# Patient Record
Sex: FEMALE | Race: WHITE | NOT HISPANIC OR LATINO | Employment: UNEMPLOYED | URBAN - METROPOLITAN AREA
[De-identification: names, ages, dates, MRNs, and addresses within clinical notes are randomized per-mention and may not be internally consistent; named-entity substitution may affect disease eponyms.]

---

## 2017-03-09 ENCOUNTER — TRANSFERRED RECORDS (OUTPATIENT)
Dept: HEALTH INFORMATION MANAGEMENT | Facility: CLINIC | Age: 52
End: 2017-03-09

## 2017-03-16 ENCOUNTER — TELEPHONE (OUTPATIENT)
Dept: INTERNAL MEDICINE | Facility: CLINIC | Age: 52
End: 2017-03-16

## 2017-03-16 NOTE — TELEPHONE ENCOUNTER
Patient called to schedule appointment for a hospital follow-up    Patient was admitted to :  [facility]  Discharged date: 3-11-17  Reason for hospital admission:  Lung issues, trouble breathing  Does patient have future appointment scheduled with provider? No  Date of future appointment:  There are no appts available with patients provider Dr. Childers, next week, as was told she needed to been seen within 5 days of discharge      This information will be used to help the care team plan for the patients upcoming visit.  The triage RN may determine that a follow up call is necessary and reach out to the patient via the phone number listed in the chart.     Please route this message high priority to the Triage RN pool.

## 2017-03-16 NOTE — TELEPHONE ENCOUNTER
"Hospital/TCU/ED for chronic condition Discharge Protocol    \"Hi, my name is Angelina Hernandezhelm, a registered nurse, and I am calling from [facility].  I am calling to follow up and see how things are going for you after your recent emergency visit/hospital/TCU stay.\"    Tell me how you are doing now that you are home?\" I am doing fine.\"       Discharge Instructions    \"Let's review your discharge instructions.  What is/are the follow-up recommendations?  Pt. Response:  I was to FU with Dr. Childers in 5 days. ( discharged 3/11/17)     \"Has an appointment with your primary care provider been scheduled?\"   No (schedule appointment) Scheduled to see DR. Childers on 3/21/17 at 4:15 PM     \"When you see the provider, I would recommend that you bring your medications with you.\"    Medications    \"Tell me what changed about your medicines when you discharged?\"    Changes to chronic meds?    0-1    \"What questions do you have about your medications?\"    None     New diagnoses of heart failure, COPD, diabetes, or MI?    No              Medication reconciliation completed?   Was MTM referral placed (*Make sure to put transitions as reason for referral)?   No    Call Summary    \"What questions or concerns do you have about your recent visit and your follow-up care?\"     none    \"If you have questions or things don't continue to improve, we encourage you contact us through the main clinic number (give number).  Even if the clinic is not open, triage nurses are available 24/7 to help you.     We would like you to know that our clinic has extended hours (provide information).  We also have urgent care (provide details on closest location and hours/contact info)\"      \"Thank you for your time and take care!\"             "

## 2017-04-05 ENCOUNTER — OFFICE VISIT (OUTPATIENT)
Dept: INTERNAL MEDICINE | Facility: CLINIC | Age: 52
End: 2017-04-05
Payer: COMMERCIAL

## 2017-04-05 VITALS
DIASTOLIC BLOOD PRESSURE: 84 MMHG | WEIGHT: 153 LBS | TEMPERATURE: 97.9 F | SYSTOLIC BLOOD PRESSURE: 126 MMHG | BODY MASS INDEX: 27.11 KG/M2 | HEART RATE: 94 BPM | OXYGEN SATURATION: 99 % | HEIGHT: 63 IN

## 2017-04-05 DIAGNOSIS — J45.20 INTERMITTENT ASTHMA, WELL CONTROLLED: ICD-10-CM

## 2017-04-05 DIAGNOSIS — R06.02 SOB (SHORTNESS OF BREATH): ICD-10-CM

## 2017-04-05 PROCEDURE — 99213 OFFICE O/P EST LOW 20 MIN: CPT | Performed by: INTERNAL MEDICINE

## 2017-04-05 RX ORDER — ALBUTEROL SULFATE 90 UG/1
AEROSOL, METERED RESPIRATORY (INHALATION)
Qty: 36 INHALER | Refills: 6 | Status: SHIPPED | OUTPATIENT
Start: 2017-04-05 | End: 2017-07-18

## 2017-04-05 RX ORDER — LORATADINE 10 MG/1
10 TABLET ORAL DAILY
COMMUNITY
End: 2019-05-03 | Stop reason: ALTCHOICE

## 2017-04-05 ASSESSMENT — PAIN SCALES - GENERAL: PAINLEVEL: NO PAIN (0)

## 2017-04-05 NOTE — PROGRESS NOTES
"Zina Harrell is a 51 year old female who presents with recheck after stay at [facility] in March 10-11. She ran out of medications and got sob.  She actually waited too long.  Then got prednisone and antibiotics and got better, still smoking.      Doing better, not coughing.  ACT score was 21.  Has Dulera and ventolin.      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 colp 8/2015 WNL     Current Outpatient Prescriptions   Medication     loratadine (CLARITIN) 10 MG tablet     DULERA 100-5 MCG/ACT oral inhaler     VENTOLIN  (90 BASE) MCG/ACT inhaler     No current facility-administered medications for this visit.      Physical Exam  /84 (BP Location: Right arm, Patient Position: Chair, Cuff Size: Adult Regular)  Pulse 94  Temp 97.9  F (36.6  C) (Temporal)  Ht 5' 3\" (1.6 m)  Wt 153 lb (69.4 kg)  SpO2 99%  BMI 27.1 kg/m2  General Appearance-healthy, alert, no distress  Cardiac-regular rate and rhythm  with normal S1, S2 ; no murmur, rub or gallops  Lungs-clear to auscultation    ASSESSMENT:  Patient here for a recheck after an asthma exacerbation and a stay in [facility]. This appears to be from running out of her controlled medication, just couldn't afford the $300 and was sob and then got better there. She is now on Dulera and I did refills. Continue Ventolin for as needed use.    Really needs to quit smoking.    Electronically signed by Lebron Childers MD      Electronically signed by Lebron Childers MD    "

## 2017-04-05 NOTE — MR AVS SNAPSHOT
"              After Visit Summary   4/5/2017    Zina Harrell    MRN: [id number]           Patient Information     Date Of Birth          1965        Visit Information        Provider Department      4/5/2017 10:30 AM Lebron Childers MD [facility]         Follow-ups after your visit        Your next 10 appointments already scheduled     Apr 05, 2017 10:30 AM CDT   [facility]    [address] [place] [id number]   [phone]           Bring a current list of meds and any records pertaining to this visit.  For Physicals, please bring immunization records and any forms needing to be filled out.  Please arrive 10 minutes early to complete paperwork.              Who to contact     If you have questions or need follow up information about today's clinic visit or your schedule please contact [facility] directly at [phone].  Normal or non-critical lab and imaging results will be communicated to you by HeartFlowhart, letter or phone within 4 business days after the clinic has received the results. If you do not hear from us within 7 days, please contact the clinic through [employer] or phone. If you have a critical or abnormal lab result, we will notify you by phone as soon as possible.  Submit refill requests through [employer] or call your pharmacy and they will forward the refill request to us. Please allow 3 business days for your refill to be completed.          Additional Information About Your Visit        [employer] Information     [employer] lets you send messages to your doctor, view your test results, renew your prescriptions, schedule appointments and more. To sign up, go to [email or web address] . Click on \"Log in\" on the left side of the screen, which will take you to the Welcome page. Then click on \"Sign up Now\" on the right side of the page.     You will be asked to enter the access " "code listed below, as well as some personal information. Please follow the directions to create your username and password.     Your access code is: [email or web address]  Expires: 2017 10:22 AM     Your access code will  in 90 days. If you need help or a new code, please call your [facility] or [phone].        Care EveryWhere ID     This is your Care EveryWhere ID. This could be used by other organizations to access your [place] medical records  [id number]        Your Vitals Were     Pulse Temperature Height Pulse Oximetry BMI (Body Mass Index)       94 97.9  F (36.6  C) (Temporal) 5' 3\" (1.6 m) 99% 27.1 kg/m2        Blood Pressure from Last 3 Encounters:   17 126/84   16 138/88   08/06/15 124/80    Weight from Last 3 Encounters:   17 153 lb (69.4 kg)   16 156 lb (70.8 kg)   08/06/15 148 lb (67.1 kg)              Today, you had the following     No orders found for display         Today's Medication Changes          These changes are accurate as of: 17 10:22 AM.  If you have any questions, ask your nurse or doctor.               Stop taking these medicines if you haven't already. Please contact your care team if you have questions.     cetirizine 10 MG tablet   Commonly known as:  zyrTEC                    Primary Care Provider Office Phone # Fax #    Lebron Childers [phone]       [facility] [address] [place] [address]        Thank you!     Thank you for choosing [facility]  for your care. Our goal is always to provide you with excellent care. Hearing back from our patients is one way we can continue to improve our services. Please take a few minutes to complete the written survey that you may receive in the mail after your visit with us. Thank you!             Your Updated Medication List - Protect others around you: Learn how to safely use, store and throw away your medicines at www.disposemymeds.org.        "   This list is accurate as of: 4/5/17 10:22 AM.  Always use your most recent med list.                   Brand Name Dispense Instructions for use    DULERA 100-5 MCG/ACT oral inhaler   Generic drug:  mometasone-formoterol     1 Inhaler    INHALE 2 PUFFS INTO THE LUNGS 2 TIMES DAILY       loratadine 10 MG tablet    CLARITIN     Take 10 mg by mouth daily       VENTOLIN  (90 BASE) MCG/ACT Inhaler   Generic drug:  albuterol     36 Inhaler    SHAKE WELL AND INHALE 2 PUFFS INTO THE LUNGS AS NEEDED

## 2017-04-06 ASSESSMENT — ASTHMA QUESTIONNAIRES: ACT_TOTALSCORE: 21

## 2017-07-18 ENCOUNTER — TELEPHONE (OUTPATIENT)
Dept: INTERNAL MEDICINE | Facility: CLINIC | Age: 52
End: 2017-07-18

## 2017-07-18 DIAGNOSIS — R06.02 SOB (SHORTNESS OF BREATH): ICD-10-CM

## 2017-07-18 DIAGNOSIS — J45.20 INTERMITTENT ASTHMA, WELL CONTROLLED: ICD-10-CM

## 2017-07-18 RX ORDER — ALBUTEROL SULFATE 90 UG/1
2 AEROSOL, METERED RESPIRATORY (INHALATION) EVERY 4 HOURS PRN
Qty: 3 INHALER | Refills: 6 | Status: SHIPPED | OUTPATIENT
Start: 2017-07-18 | End: 2018-07-26

## 2017-07-18 NOTE — TELEPHONE ENCOUNTER
Reason for Call:  Other prescription    Detailed comments: Allison calling from [facility] on this patient, patient is on VENTOLIN inhaler dosage 20 puffs per day and insurance will not over this, as it is above the recommended dose, pharmacy would like to know if you want to do a Prior Auth or change the dosage , please call and avdise    Phone Number Patient can be reached at: Other phone number:  [phone]    Best Time: any    Can we leave a detailed message on this number? Not Applicable    Call taken on 7/18/2017 at 10:07 AM by Ellie Pérez

## 2017-11-02 DIAGNOSIS — J45.20 INTERMITTENT ASTHMA, WELL CONTROLLED: ICD-10-CM

## 2017-11-06 RX ORDER — MOMETASONE FUROATE AND FORMOTEROL FUMARATE DIHYDRATE 100; 5 UG/1; UG/1
AEROSOL RESPIRATORY (INHALATION)
Qty: 13 G | Refills: 4 | Status: SHIPPED | OUTPATIENT
Start: 2017-11-06 | End: 2018-06-20

## 2017-11-06 NOTE — TELEPHONE ENCOUNTER
Prescription approved per [facility] Refill Protocol.  Imani Stanton RN

## 2017-11-19 ENCOUNTER — HEALTH MAINTENANCE LETTER (OUTPATIENT)
Age: 52
End: 2017-11-19

## 2017-11-27 ENCOUNTER — OFFICE VISIT (OUTPATIENT)
Dept: INTERNAL MEDICINE | Facility: CLINIC | Age: 52
End: 2017-11-27
Payer: COMMERCIAL

## 2017-11-27 VITALS
DIASTOLIC BLOOD PRESSURE: 84 MMHG | HEART RATE: 96 BPM | RESPIRATION RATE: 16 BRPM | BODY MASS INDEX: 27.99 KG/M2 | SYSTOLIC BLOOD PRESSURE: 122 MMHG | WEIGHT: 158 LBS | TEMPERATURE: 98.4 F | OXYGEN SATURATION: 100 %

## 2017-11-27 DIAGNOSIS — Z23 NEED FOR PROPHYLACTIC VACCINATION AND INOCULATION AGAINST INFLUENZA: ICD-10-CM

## 2017-11-27 DIAGNOSIS — Z12.11 SPECIAL SCREENING FOR MALIGNANT NEOPLASMS, COLON: ICD-10-CM

## 2017-11-27 DIAGNOSIS — Z72.0 TOBACCO ABUSE: ICD-10-CM

## 2017-11-27 DIAGNOSIS — Z00.00 ENCOUNTER FOR ROUTINE ADULT HEALTH EXAMINATION WITHOUT ABNORMAL FINDINGS: Primary | ICD-10-CM

## 2017-11-27 DIAGNOSIS — J30.2 CHRONIC SEASONAL ALLERGIC RHINITIS, UNSPECIFIED TRIGGER: ICD-10-CM

## 2017-11-27 DIAGNOSIS — J45.20 INTERMITTENT ASTHMA, WELL CONTROLLED: ICD-10-CM

## 2017-11-27 DIAGNOSIS — R05.9 COUGH: ICD-10-CM

## 2017-11-27 DIAGNOSIS — Z12.31 VISIT FOR SCREENING MAMMOGRAM: ICD-10-CM

## 2017-11-27 LAB
ALBUMIN SERPL-MCNC: 3.8 G/DL (ref 3.4–5)
ALP SERPL-CCNC: 94 U/L (ref 40–150)
ALT SERPL W P-5'-P-CCNC: 36 U/L (ref 0–50)
ANION GAP SERPL CALCULATED.3IONS-SCNC: 5 MMOL/L (ref 3–14)
AST SERPL W P-5'-P-CCNC: 23 U/L (ref 0–45)
BILIRUB SERPL-MCNC: 0.5 MG/DL (ref 0.2–1.3)
BUN SERPL-MCNC: 10 MG/DL (ref 7–30)
CALCIUM SERPL-MCNC: 8.8 MG/DL (ref 8.5–10.1)
CHLORIDE SERPL-SCNC: 107 MMOL/L (ref 94–109)
CHOLEST SERPL-MCNC: 120 MG/DL
CO2 SERPL-SCNC: 29 MMOL/L (ref 20–32)
CREAT SERPL-MCNC: 0.78 MG/DL (ref 0.52–1.04)
GFR SERPL CREATININE-BSD FRML MDRD: 78 ML/MIN/1.7M2
GLUCOSE SERPL-MCNC: 95 MG/DL (ref 70–99)
HDLC SERPL-MCNC: 105 MG/DL
LDLC SERPL CALC-MCNC: 11 MG/DL
NONHDLC SERPL-MCNC: 15 MG/DL
POTASSIUM SERPL-SCNC: 4 MMOL/L (ref 3.4–5.3)
PROT SERPL-MCNC: 7.4 G/DL (ref 6.8–8.8)
SODIUM SERPL-SCNC: 141 MMOL/L (ref 133–144)
TRIGL SERPL-MCNC: 22 MG/DL

## 2017-11-27 PROCEDURE — 36415 COLL VENOUS BLD VENIPUNCTURE: CPT | Performed by: INTERNAL MEDICINE

## 2017-11-27 PROCEDURE — 99396 PREV VISIT EST AGE 40-64: CPT | Mod: 25 | Performed by: INTERNAL MEDICINE

## 2017-11-27 PROCEDURE — 80053 COMPREHEN METABOLIC PANEL: CPT | Performed by: INTERNAL MEDICINE

## 2017-11-27 PROCEDURE — 90686 IIV4 VACC NO PRSV 0.5 ML IM: CPT | Performed by: INTERNAL MEDICINE

## 2017-11-27 PROCEDURE — 80061 LIPID PANEL: CPT | Performed by: INTERNAL MEDICINE

## 2017-11-27 PROCEDURE — 90471 IMMUNIZATION ADMIN: CPT | Performed by: INTERNAL MEDICINE

## 2017-11-27 PROCEDURE — G0145 SCR C/V CYTO,THINLAYER,RESCR: HCPCS | Performed by: INTERNAL MEDICINE

## 2017-11-27 PROCEDURE — 87624 HPV HI-RISK TYP POOLED RSLT: CPT | Performed by: INTERNAL MEDICINE

## 2017-11-27 RX ORDER — FLUTICASONE PROPIONATE 44 UG/1
AEROSOL, METERED RESPIRATORY (INHALATION)
Qty: 2 INHALER | Refills: 3 | Status: SHIPPED | OUTPATIENT
Start: 2017-11-27 | End: 2018-05-07 | Stop reason: DRUGHIGH

## 2017-11-27 RX ORDER — GUAIFENESIN 600 MG/1
600 TABLET, EXTENDED RELEASE ORAL 2 TIMES DAILY PRN
Qty: 60 TABLET | Refills: 3 | Status: SHIPPED | OUTPATIENT
Start: 2017-11-27 | End: 2018-05-07

## 2017-11-27 RX ORDER — CETIRIZINE HYDROCHLORIDE 10 MG/1
10 TABLET ORAL EVERY EVENING
Qty: 90 TABLET | Refills: 3 | Status: SHIPPED | OUTPATIENT
Start: 2017-11-27 | End: 2018-12-20

## 2017-11-27 ASSESSMENT — PAIN SCALES - GENERAL: PAINLEVEL: MODERATE PAIN (4)

## 2017-11-27 NOTE — LETTER
December 12, 2017    Zina Harrell  [address]   [place] [address]    Dear Zina,  We are happy to inform you that your PAP smear result from 11/27/17 is normal.  We are now able to do a follow up test on PAP smears. The DNA test is for HPV (Human Papilloma Virus). Cervical cancer is closely linked with certain types of HPV. Your result showed no evidence of high risk HPV.  Therefore we recommend you return in 3 years for your next pap smear and HPV test.  You will still need to return to the clinic every year for an annual exam and other preventive tests.  Please contact the clinic at [phone] with any questions.  Sincerely,    Lebron Childers MD/tianna

## 2017-11-27 NOTE — PROGRESS NOTES
Injectable Influenza Immunization Documentation    1.  Is the person to be vaccinated sick today?   No    2. Does the person to be vaccinated have an allergy to a component   of the vaccine?   No  Egg Allergy Algorithm Link    3. Has the person to be vaccinated ever had a serious reaction   to influenza vaccine in the past?   No    4. Has the person to be vaccinated ever had Guillain-Barré syndrome?   No    Form completed by Bambi Kasper CMA    Prior to injection verified patient identity using patient's name and date of birth. Pt was advised to stay in clinic for 15 minutes after flu shot and report any reactions right away.

## 2017-11-27 NOTE — MR AVS SNAPSHOT
"              After Visit Summary   2017    Zina Harrell    MRN: [id number]           Patient Information     Date Of Birth          1965        Visit Information        Provider Department      2017 11:30 AM Lebron Chiledrs MD [facility]        Today's Diagnoses     Asthma, intermittent controlled    -  1       Follow-ups after your visit        Who to contact     If you have questions or need follow up information about today's clinic visit or your schedule please contact [facility] directly at [phone].  Normal or non-critical lab and imaging results will be communicated to you by evolsohart, letter or phone within 4 business days after the clinic has received the results. If you do not hear from us within 7 days, please contact the clinic through evolsohart or phone. If you have a critical or abnormal lab result, we will notify you by phone as soon as possible.  Submit refill requests through [employer] or call your pharmacy and they will forward the refill request to us. Please allow 3 business days for your refill to be completed.          Additional Information About Your Visit        MyChart Information     [employer] lets you send messages to your doctor, view your test results, renew your prescriptions, schedule appointments and more. To sign up, go to [email or web address] . Click on \"Log in\" on the left side of the screen, which will take you to the Welcome page. Then click on \"Sign up Now\" on the right side of the page.     You will be asked to enter the access code listed below, as well as some personal information. Please follow the directions to create your username and password.     Your access code is: [email or web address]  Expires: 2018 11:54 AM     Your access code will  in 90 days. If you need help or a new code, please call your [facility] or [phone].        Care EveryWhere ID     This is your Care EveryWhere ID. This could be used by " other organizations to access your [place] medical records  [id number]        Your Vitals Were     Pulse Temperature Respirations Last Period Pulse Oximetry BMI (Body Mass Index)    96 98.4  F (36.9  C) (Temporal) 16 03/14/2016 (Within Months) 100% 27.99 kg/m2       Blood Pressure from Last 3 Encounters:   11/27/17 122/84   04/05/17 126/84   09/14/16 138/88    Weight from Last 3 Encounters:   11/27/17 158 lb (71.7 kg)   04/05/17 153 lb (69.4 kg)   09/14/16 156 lb (70.8 kg)              We Performed the Following     Asthma Action Plan (AAP)        Primary Care Provider Office Phone # Fax #    Lebron Childers -[phone]       [facility] [address] [place] [address]        Equal Access to Services     [facility] Hospital[facility] : Hadii aad gabriel hadasho Soomaali, waaxda luqadaha, qaybta kaalmada adeegyada, waxay remyin haylynda montero . So [facility] [phone].    ATENCIÓN: Si habla español, tiene a bay disposición servicios gratuitos de asistencia lingüística. Llame al [phone].    We comply with applicable federal civil rights laws and [place] laws. We do not discriminate on the basis of race, color, national origin, age, disability, sex, sexual orientation, or gender identity.            Thank you!     Thank you for choosing [facility]  for your care. Our goal is always to provide you with excellent care. Hearing back from our patients is one way we can continue to improve our services. Please take a few minutes to complete the written survey that you may receive in the mail after your visit with us. Thank you!             Your Updated Medication List - Protect others around you: Learn how to safely use, store and throw away your medicines at www.disposemymeds.org.          This list is accurate as of: 11/27/17 11:54 AM.  Always use your most recent med list.                   Brand Name Dispense Instructions for use Diagnosis    albuterol 108 (90 BASE)  MCG/ACT Inhaler    VENTOLIN HFA    3 Inhaler    Inhale 2 puffs into the lungs every 4 hours as needed for shortness of breath / dyspnea or wheezing SHAKE WELL AND INHALE 2 PUFFS INTO THE LUNGS AS NEEDED    Intermittent asthma, well controlled, SOB (shortness of breath)       DULERA 100-5 MCG/ACT oral inhaler   Generic drug:  mometasone-formoterol     13 g    INHALE 2 PUFFS INTO THE LUNGS 2 TIMES DAILY    Intermittent asthma, well controlled       loratadine 10 MG tablet    CLARITIN     Take 10 mg by mouth daily

## 2017-11-27 NOTE — PROGRESS NOTES
SUBJECTIVE:   CC: Zina Harrell is an 52 year old woman who presents for preventive health visit.     Physical   Annual:     Getting at least 3 servings of Calcium per day::  NO    Bi-annual eye exam::  Yes    Dental care twice a year::  NO    Sleep apnea or symptoms of sleep apnea::  None    Taking medications regularly::  Yes    Medication side effects::  None    Additional concerns today::  No    She has been ok on her asthma, she liked flovent better then dulera.  She wonders about zyrtec or mucinex to be covered.      She has a chronic tremor and her uncle had parkinson's disease.  Mother had tremor as well.       has moved out.  She is better with her mood.     Today's PHQ-2 Score: PHQ-2 ( 1999 Pfizer) 11/27/2017   Q1: Little interest or pleasure in doing things 0   Q2: Feeling down, depressed or hopeless 0   PHQ-2 Score 0   Q1: Little interest or pleasure in doing things Not at all   Q2: Feeling down, depressed or hopeless Not at all   PHQ-2 Score 0       Abuse: Current or Past(Physical, Sexual or Emotional)- No  Do you feel safe in your environment - Yes    Social History   Substance Use Topics     Smoking status: Current Every Day Smoker     Packs/day: 0.50     Smokeless tobacco: Never Used      Comment: smoked since age 20     Alcohol use Yes      Comment: rarely     The patient does not drink >3 drinks per day nor >7 drinks per week.    Reviewed orders with patient.  Reviewed health maintenance and updated orders accordingly - Yes    Patient over age 50, mutual decision to screen reflected in health maintenance.      Pertinent mammograms are reviewed under the imaging tab.  History of abnormal Pap smear: history of colposcopy and needs yearly pap.     Reviewed and updated as needed this visit by clinical staffAllergies         Reviewed and updated as needed this visit by Provider            Review of Systems  C: NEGATIVE for fever, chills, change in weight  I: NEGATIVE for worrisome rashes,  moles or lesions  E: NEGATIVE for vision changes or irritation  ENT: NEGATIVE for ear, mouth and throat problems  R: NEGATIVE for significant cough or SOB  B: NEGATIVE for masses, tenderness or discharge  CV: NEGATIVE for chest pain, palpitations or peripheral edema  GI: NEGATIVE for nausea, abdominal pain, heartburn, or change in bowel habits  : NEGATIVE for unusual urinary or vaginal symptoms. No vaginal bleeding.   menopausal female: history of colposcopy then ok pap.   M: NEGATIVE for significant arthralgias or myalgia  N: NEGATIVE for weakness, dizziness or paresthesias  P: NEGATIVE for changes in mood or affect      OBJECTIVE:   /84  Pulse 96  Temp 98.4  F (36.9  C) (Temporal)  Resp 16  Wt 158 lb (71.7 kg)  LMP 03/14/2016 (Within Months)  SpO2 100%  BMI 27.99 kg/m2  Physical Exam  GENERAL: healthy, alert and no distress  EYES: Eyes grossly normal to inspection, PERRL and conjunctivae and sclerae normal  HENT: ear canals and TM's normal, nose and mouth without ulcers or lesions  NECK: no adenopathy, no asymmetry, masses, or scars and thyroid normal to palpation  RESP: lungs clear to auscultation - no rales, rhonchi or wheezes  BREAST: normal without masses, tenderness or nipple discharge and no palpable axillary masses or adenopathy  CV: regular rate and rhythm, normal S1 S2, no S3 or S4, no murmur, click or rub, no peripheral edema and peripheral pulses strong  ABDOMEN: soft, nontender, no hepatosplenomegaly, no masses and bowel sounds normal   (female): normal female external genitalia, normal urethral meatus, vaginal mucosa pink, moist, well rugated, and normal cervix/adnexa/uterus without masses or discharge   (female): Crystal Kasper present for breast and pelvic exam  MS: no gross musculoskeletal defects noted, no edema  SKIN: no suspicious lesions or rashes  NEURO: Normal strength and tone, mentation intact and speech normal  PSYCH: mentation appears normal, affect  "normal/bright    ASSESSMENT/PLAN:       ICD-10-CM    1. Encounter for routine adult health examination without abnormal findings Z00.00 Lipid Profile     Comprehensive metabolic panel     Pap imaged thin layer screen with HPV - recommended age 30 - 65 years (select HPV order below)   2. Asthma, intermittent controlled J45.20 Asthma Action Plan (AAP)     fluticasone (FLOVENT HFA) 44 MCG/ACT Inhaler   3. Chronic seasonal allergic rhinitis, unspecified trigger J30.2 cetirizine (ZYRTEC) 10 MG tablet   4. Cough R05 guaiFENesin (MUCINEX) 600 MG 12 hr tablet   5. Special screening for malignant neoplasms, colon Z12.11 Fecal colorectal cancer screen (FIT)   6. Tobacco abuse Z72.0    7. Need for prophylactic vaccination and inoculation against influenza Z23 FLU VAC, SPLIT VIRUS IM > 3 YO (QUADRIVALENT) [98140]     Vaccine Administration, Initial [52377]   8. Visit for screening mammogram Z12.31 *MA Screening Digital Bilateral     Pap done due to past abnormal.  Asthma is ok, try other inhaler as she preferred those, needs to quit smoking,  Discussed fam history of tremors and dementia.    COUNSELING:  Reviewed preventive health counseling, as reflected in patient instructions       Regular exercise       Healthy diet/nutrition       Immunizations    Vaccinated for: Influenza               reports that she has been smoking.  She has been smoking about 0.50 packs per day. She has never used smokeless tobacco.  Tobacco Cessation Action Plan: Information offered: Patient not interested at this time  Estimated body mass index is 27.1 kg/(m^2) as calculated from the following:    Height as of 4/5/17: 5' 3\" (1.6 m).    Weight as of 4/5/17: 153 lb (69.4 kg).         Counseling Resources:  ATP IV Guidelines  Pooled Cohorts Equation Calculator  Breast Cancer Risk Calculator  FRAX Risk Assessment  ICSI Preventive Guidelines  Dietary Guidelines for Americans, 2010  USDA's MyPlate  ASA Prophylaxis  Lung CA Screening    Lebron Childers, " MD  [facility]

## 2017-11-27 NOTE — NURSING NOTE
"Chief Complaint   Patient presents with     Physical       Initial /84  Pulse 96  Temp 98.4  F (36.9  C) (Temporal)  Resp 16  Wt 158 lb (71.7 kg)  LMP 03/14/2016 (Within Months)  SpO2 100%  BMI 27.99 kg/m2 Estimated body mass index is 27.99 kg/(m^2) as calculated from the following:    Height as of 4/5/17: 5' 3\" (1.6 m).    Weight as of this encounter: 158 lb (71.7 kg).  Medication Reconciliation: complete    "

## 2017-11-28 ASSESSMENT — ASTHMA QUESTIONNAIRES: ACT_TOTALSCORE: 23

## 2017-11-30 LAB
COPATH REPORT: NORMAL
PAP: NORMAL

## 2017-12-01 LAB
FINAL DIAGNOSIS: NORMAL
HPV HR 12 DNA CVX QL NAA+PROBE: NEGATIVE
HPV16 DNA SPEC QL NAA+PROBE: NEGATIVE
HPV18 DNA SPEC QL NAA+PROBE: NEGATIVE
SPECIMEN DESCRIPTION: NORMAL

## 2018-01-11 ENCOUNTER — RADIANT APPOINTMENT (OUTPATIENT)
Dept: MAMMOGRAPHY | Facility: OTHER | Age: 53
End: 2018-01-11
Attending: INTERNAL MEDICINE
Payer: COMMERCIAL

## 2018-01-11 DIAGNOSIS — Z12.31 VISIT FOR SCREENING MAMMOGRAM: ICD-10-CM

## 2018-01-11 PROCEDURE — 77067 SCR MAMMO BI INCL CAD: CPT | Mod: TC

## 2018-02-28 ENCOUNTER — TELEPHONE (OUTPATIENT)
Dept: FAMILY MEDICINE | Facility: CLINIC | Age: 53
End: 2018-02-28

## 2018-02-28 DIAGNOSIS — J45.20 INTERMITTENT ASTHMA, WELL CONTROLLED: ICD-10-CM

## 2018-02-28 RX ORDER — FLUTICASONE PROPIONATE 110 UG/1
2 AEROSOL, METERED RESPIRATORY (INHALATION) 2 TIMES DAILY
Qty: 3 INHALER | Refills: 3 | Status: SHIPPED | OUTPATIENT
Start: 2018-02-28 | End: 2019-04-04

## 2018-04-24 ENCOUNTER — TELEPHONE (OUTPATIENT)
Dept: FAMILY MEDICINE | Facility: CLINIC | Age: 53
End: 2018-04-24

## 2018-04-24 NOTE — TELEPHONE ENCOUNTER
Panel Management Review      Patient has the following on her problem list:     Asthma review     ACT Total Scores 11/27/2017   ACT TOTAL SCORE -   ASTHMA ER VISITS -   ASTHMA HOSPITALIZATIONS -   ACT TOTAL SCORE (Goal Greater than or Equal to 20) 23   In the past 12 months, how many times did you visit the emergency room for your asthma without being admitted to the hospital? 1   In the past 12 months, how many times were you hospitalized overnight because of your asthma? 1      1. Is Asthma diagnosis on the Problem List? Yes    2. Is Asthma listed on Health Maintenance? Yes    3. Patient is due for:  ACT      Composite cancer screening  Chart review shows that this patient is due/due soon for the following None  Summary:    Patient is due/failing the following:   ACT    Action needed:   ACT form mailed to pt to fill out and return     Type of outreach:    Copy of ACT mailed to patient, will reach out in 5 days.    Questions for provider review:    None                                                                                                                                    Bambi Kasper [facility]       Chart routed to Care Team .

## 2018-04-24 NOTE — LETTER
April 24, 2018    Zina Harrell  [address]  [place] [address]    Dear Ronn Izaguirre cares about your health and your health plan.  I have reviewed your medical conditions, medication list and lab results, and am making recommendations based on this review to better manage your health.    You are in particular need of attention regarding:  -Asthma    I am recommending that you:     Please fill out the form and mail back.     Please call us at the [phone] or use [employer] to address the above recommendations.     Thank you for trusting [facility].  We appreciate the opportunity to serve you and look forward to supporting your healthcare in the future.    If you have (or plan to have) any of these tests done at a facility other than a [facility] or a [facility], please have the results sent to the [facility] location noted above.      Best Regards,    Dr. Childers

## 2018-05-07 ENCOUNTER — OFFICE VISIT (OUTPATIENT)
Dept: INTERNAL MEDICINE | Facility: CLINIC | Age: 53
End: 2018-05-07
Payer: COMMERCIAL

## 2018-05-07 VITALS
WEIGHT: 167 LBS | RESPIRATION RATE: 16 BRPM | HEART RATE: 94 BPM | BODY MASS INDEX: 29.59 KG/M2 | SYSTOLIC BLOOD PRESSURE: 126 MMHG | DIASTOLIC BLOOD PRESSURE: 84 MMHG | TEMPERATURE: 98.5 F | HEIGHT: 63 IN | OXYGEN SATURATION: 95 %

## 2018-05-07 DIAGNOSIS — Z78.0 MENOPAUSE PRESENT: Primary | ICD-10-CM

## 2018-05-07 DIAGNOSIS — Z12.11 SPECIAL SCREENING FOR MALIGNANT NEOPLASM OF COLON: ICD-10-CM

## 2018-05-07 DIAGNOSIS — R05.9 COUGH: ICD-10-CM

## 2018-05-07 DIAGNOSIS — J45.20 INTERMITTENT ASTHMA, WELL CONTROLLED: ICD-10-CM

## 2018-05-07 DIAGNOSIS — Z72.0 TOBACCO ABUSE: ICD-10-CM

## 2018-05-07 LAB
FSH SERPL-ACNC: 105.7 IU/L
LH SERPL-ACNC: 34.4 IU/L

## 2018-05-07 PROCEDURE — 83001 ASSAY OF GONADOTROPIN (FSH): CPT | Performed by: INTERNAL MEDICINE

## 2018-05-07 PROCEDURE — 36415 COLL VENOUS BLD VENIPUNCTURE: CPT | Performed by: INTERNAL MEDICINE

## 2018-05-07 PROCEDURE — 83002 ASSAY OF GONADOTROPIN (LH): CPT | Performed by: INTERNAL MEDICINE

## 2018-05-07 PROCEDURE — 99213 OFFICE O/P EST LOW 20 MIN: CPT | Performed by: INTERNAL MEDICINE

## 2018-05-07 RX ORDER — GUAIFENESIN 600 MG/1
600 TABLET, EXTENDED RELEASE ORAL 2 TIMES DAILY PRN
Qty: 60 TABLET | Refills: 3 | Status: SHIPPED | OUTPATIENT
Start: 2018-05-07 | End: 2019-05-03

## 2018-05-07 ASSESSMENT — PAIN SCALES - GENERAL: PAINLEVEL: NO PAIN (0)

## 2018-05-07 NOTE — PROGRESS NOTES
"  SUBJECTIVE:   Zina Harrell is a 52 year old female who presents to clinic today for the following health issues:      Chief Complaint   Patient presents with     Menopausal Sx     discuss menopause symptoms     She hasn't had a period for a few years.  She was getting hot flashes with face redness and hotness that is less now.  No hotflashes for a few months.     More emotional like with her periods she says.  Hard to know about it with her stress as well.   Commercials can get her emotional.      She is interested in checking labs for her hormones.      BP is stable now.    She is living on her own now,     Past Medical History:   Diagnosis Date     Asthma, intermittent controlled age 8    age 16 hospitalized     High risk HPV infection 6/5/15    normal pap. colp 8/2015 WNL     Current Outpatient Prescriptions   Medication     albuterol (VENTOLIN HFA) 108 (90 BASE) MCG/ACT Inhaler     cetirizine (ZYRTEC) 10 MG tablet     DULERA 100-5 MCG/ACT oral inhaler     fluticasone (FLOVENT HFA) 110 MCG/ACT Inhaler     guaiFENesin (MUCINEX) 600 MG 12 hr tablet     loratadine (CLARITIN) 10 MG tablet     [DISCONTINUED] fluticasone (FLOVENT HFA) 44 MCG/ACT Inhaler     No current facility-administered medications for this visit.      Social History   Substance Use Topics     Smoking status: Current Every Day Smoker     Packs/day: 0.50     Smokeless tobacco: Never Used      Comment: smoked since age 20     Alcohol use Yes      Comment: rarely     Physical Exam  /84  Pulse 94  Temp 98.5  F (36.9  C) (Temporal)  Resp 16  Ht 5' 3\" (1.6 m)  Wt 167 lb (75.8 kg)  LMP 03/14/2016 (Within Months)  SpO2 95%  BMI 29.58 kg/m2  General Appearance-healthy, alert, no distress  Mild emotional    ASSESSMENT:  Patient that has gone through menapause and is curious about it. She denies hot flashes recently, no periods for two years. She does have some stress and she gets emotional about not having children and now knowing she " can't have them. She doesn't want to do more counseling, she doesn't want any medication. We will check labs for her today.      Asthma is ok, was worse in the winter but now doing better, knows she still has to quit smoking,    Electronically signed by Lebron Childers MD

## 2018-05-07 NOTE — MR AVS SNAPSHOT
"              After Visit Summary   2018    Zina Harrell    MRN: [id number]           Patient Information     Date Of Birth          1965        Visit Information        Provider Department      2018 1:00 PM Lebron Childers MD [facility]         Follow-ups after your visit        Who to contact     If you have questions or need follow up information about today's clinic visit or your schedule please contact [facility] directly at [phone].  Normal or non-critical lab and imaging results will be communicated to you by MyChart, letter or phone within 4 business days after the clinic has received the results. If you do not hear from us within 7 days, please contact the clinic through MyChart or phone. If you have a critical or abnormal lab result, we will notify you by phone as soon as possible.  Submit refill requests through [employer] or call your pharmacy and they will forward the refill request to us. Please allow 3 business days for your refill to be completed.          Additional Information About Your Visit        [facility] Information     [employer] lets you send messages to your doctor, view your test results, renew your prescriptions, schedule appointments and more. To sign up, go to [email or web address] . Click on \"Log in\" on the left side of the screen, which will take you to the Welcome page. Then click on \"Sign up Now\" on the right side of the page.     You will be asked to enter the access code listed below, as well as some personal information. Please follow the directions to create your username and password.     Your access code is: [email or web address]  Expires: 2018  1:02 PM     Your access code will  in 90 days. If you need help or a new code, please call your [facility] or [phone].        Care EveryWhere ID     This is your Care EveryWhere ID. This could be used by other organizations to access your [facility] medical " "records  [id number]        Your Vitals Were     Pulse Temperature Respirations Height Last Period Pulse Oximetry    94 98.5  F (36.9  C) (Temporal) 16 5' 3\" (1.6 m) 03/14/2016 (Within Months) 95%    BMI (Body Mass Index)                   29.58 kg/m2            Blood Pressure from Last 3 Encounters:   05/07/18 126/84   11/27/17 122/84   04/05/17 126/84    Weight from Last 3 Encounters:   05/07/18 167 lb (75.8 kg)   11/27/17 158 lb (71.7 kg)   04/05/17 153 lb (69.4 kg)              Today, you had the following     No orders found for display         Today's Medication Changes          These changes are accurate as of 5/7/18  1:02 PM.  If you have any questions, ask your nurse or doctor.               These medicines have changed or have updated prescriptions.        Dose/Directions    fluticasone 110 MCG/ACT Inhaler   Commonly known as:  FLOVENT HFA   This may have changed:  Another medication with the same name was removed. Continue taking this medication, and follow the directions you see here.   Used for:  Intermittent asthma, well controlled   Changed by:  Lebron Childers MD        Dose:  2 puff   Inhale 2 puffs into the lungs 2 times daily   Quantity:  3 Inhaler   Refills:  3                Primary Care Provider Office Phone # Fax #    Lebron Childers [phone]       [address] [place] [address]        Equal Access to Services     [facility]wardRULA AH: Hadii aad ku hadasho Soomaali, waaxda luqadaha, qaybta kaalmada adeegyada, waxcecy christopher haylynda weston la'lynda ah. So [facility] [phone].    ATENCIÓN: Si habla español, tiene a bay disposición servicios gratuitos de asistencia lingüística. Llame al [phone].    We comply with applicable federal civil rights laws and [place] laws. We do not discriminate on the basis of race, color, national origin, age, disability, sex, sexual orientation, or gender identity.            Thank you!     Thank you for choosing [facility]" for your care. Our goal is always to provide you with excellent care. Hearing back from our patients is one way we can continue to improve our services. Please take a few minutes to complete the written survey that you may receive in the mail after your visit with us. Thank you!             Your Updated Medication List - Protect others around you: Learn how to safely use, store and throw away your medicines at www.disposemymeds.org.          This list is accurate as of 5/7/18  1:02 PM.  Always use your most recent med list.                   Brand Name Dispense Instructions for use Diagnosis    albuterol 108 (90 Base) MCG/ACT Inhaler    VENTOLIN HFA    3 Inhaler    Inhale 2 puffs into the lungs every 4 hours as needed for shortness of breath / dyspnea or wheezing SHAKE WELL AND INHALE 2 PUFFS INTO THE LUNGS AS NEEDED    Intermittent asthma, well controlled, SOB (shortness of breath)       cetirizine 10 MG tablet    zyrTEC    90 tablet    Take 1 tablet (10 mg) by mouth every evening    Chronic seasonal allergic rhinitis, unspecified trigger       DULERA 100-5 MCG/ACT oral inhaler   Generic drug:  mometasone-formoterol     13 g    INHALE 2 PUFFS INTO THE LUNGS 2 TIMES DAILY    Intermittent asthma, well controlled       fluticasone 110 MCG/ACT Inhaler    FLOVENT HFA    3 Inhaler    Inhale 2 puffs into the lungs 2 times daily    Intermittent asthma, well controlled       guaiFENesin 600 MG 12 hr tablet    MUCINEX    60 tablet    Take 1 tablet (600 mg) by mouth 2 times daily as needed for congestion    Cough       loratadine 10 MG tablet    CLARITIN     Take 10 mg by mouth daily

## 2018-05-08 ASSESSMENT — ASTHMA QUESTIONNAIRES: ACT_TOTALSCORE: 20

## 2018-05-09 ENCOUNTER — TELEPHONE (OUTPATIENT)
Dept: INTERNAL MEDICINE | Facility: CLINIC | Age: 53
End: 2018-05-09

## 2018-06-20 RX ORDER — MULTIPLE VITAMINS W/ MINERALS TAB 9MG-400MCG
1 TAB ORAL DAILY
COMMUNITY

## 2018-06-20 RX ORDER — OMEGA-3-ACID ETHYL ESTERS 1 G/1
2 CAPSULE, LIQUID FILLED ORAL 2 TIMES DAILY
COMMUNITY
End: 2022-02-01

## 2018-07-02 ENCOUNTER — HOSPITAL ENCOUNTER (OUTPATIENT)
Facility: CLINIC | Age: 53
Discharge: HOME | End: 2018-07-02
Attending: FAMILY MEDICINE | Admitting: FAMILY MEDICINE
Payer: COMMERCIAL

## 2018-07-02 ENCOUNTER — ANESTHESIA (OUTPATIENT)
Dept: GASTROENTEROLOGY | Facility: CLINIC | Age: 53
End: 2018-07-02
Payer: COMMERCIAL

## 2018-07-02 ENCOUNTER — ANESTHESIA EVENT (OUTPATIENT)
Dept: GASTROENTEROLOGY | Facility: CLINIC | Age: 53
End: 2018-07-02
Payer: COMMERCIAL

## 2018-07-02 ENCOUNTER — SURGERY (OUTPATIENT)
Age: 53
End: 2018-07-02
Payer: COMMERCIAL

## 2018-07-02 VITALS
HEART RATE: 84 BPM | TEMPERATURE: 98.1 F | RESPIRATION RATE: 16 BRPM | DIASTOLIC BLOOD PRESSURE: 84 MMHG | OXYGEN SATURATION: 100 % | SYSTOLIC BLOOD PRESSURE: 124 MMHG

## 2018-07-02 LAB — COLONOSCOPY: NORMAL

## 2018-07-02 PROCEDURE — 45378 DIAGNOSTIC COLONOSCOPY: CPT | Performed by: FAMILY MEDICINE

## 2018-07-02 PROCEDURE — 40000296 ZZH STATISTIC ENDO RECOVERY CLASS 1:2 FIRST HOUR: Performed by: FAMILY MEDICINE

## 2018-07-02 PROCEDURE — 25000128 H RX IP 250 OP 636: Performed by: NURSE ANESTHETIST, CERTIFIED REGISTERED

## 2018-07-02 PROCEDURE — 25000125 ZZHC RX 250: Performed by: SURGERY

## 2018-07-02 PROCEDURE — G0121 COLON CA SCRN NOT HI RSK IND: HCPCS | Performed by: FAMILY MEDICINE

## 2018-07-02 PROCEDURE — 25000125 ZZHC RX 250: Performed by: NURSE ANESTHETIST, CERTIFIED REGISTERED

## 2018-07-02 RX ORDER — PROPOFOL 10 MG/ML
INJECTION, EMULSION INTRAVENOUS PRN
Status: DISCONTINUED | OUTPATIENT
Start: 2018-07-02 | End: 2018-07-02

## 2018-07-02 RX ORDER — ONDANSETRON 2 MG/ML
4 INJECTION INTRAMUSCULAR; INTRAVENOUS EVERY 30 MIN PRN
Status: DISCONTINUED | OUTPATIENT
Start: 2018-07-02 | End: 2018-07-02 | Stop reason: HOSPADM

## 2018-07-02 RX ORDER — NALOXONE HYDROCHLORIDE 0.4 MG/ML
.1-.4 INJECTION, SOLUTION INTRAMUSCULAR; INTRAVENOUS; SUBCUTANEOUS
Status: DISCONTINUED | OUTPATIENT
Start: 2018-07-02 | End: 2018-07-02 | Stop reason: HOSPADM

## 2018-07-02 RX ORDER — SODIUM CHLORIDE, SODIUM LACTATE, POTASSIUM CHLORIDE, CALCIUM CHLORIDE 600; 310; 30; 20 MG/100ML; MG/100ML; MG/100ML; MG/100ML
INJECTION, SOLUTION INTRAVENOUS CONTINUOUS
Status: DISCONTINUED | OUTPATIENT
Start: 2018-07-02 | End: 2018-07-02 | Stop reason: HOSPADM

## 2018-07-02 RX ORDER — ONDANSETRON 2 MG/ML
4 INJECTION INTRAMUSCULAR; INTRAVENOUS
Status: DISCONTINUED | OUTPATIENT
Start: 2018-07-02 | End: 2018-07-02 | Stop reason: HOSPADM

## 2018-07-02 RX ORDER — MEPERIDINE HYDROCHLORIDE 25 MG/ML
12.5 INJECTION INTRAMUSCULAR; INTRAVENOUS; SUBCUTANEOUS
Status: DISCONTINUED | OUTPATIENT
Start: 2018-07-02 | End: 2018-07-02 | Stop reason: HOSPADM

## 2018-07-02 RX ORDER — LIDOCAINE 40 MG/G
CREAM TOPICAL
Status: DISCONTINUED | OUTPATIENT
Start: 2018-07-02 | End: 2018-07-02 | Stop reason: HOSPADM

## 2018-07-02 RX ORDER — ONDANSETRON 4 MG/1
4 TABLET, ORALLY DISINTEGRATING ORAL EVERY 30 MIN PRN
Status: DISCONTINUED | OUTPATIENT
Start: 2018-07-02 | End: 2018-07-02 | Stop reason: HOSPADM

## 2018-07-02 RX ORDER — LIDOCAINE 40 MG/G
CREAM TOPICAL
Status: DISCONTINUED | OUTPATIENT
Start: 2018-07-02 | End: 2018-07-02

## 2018-07-02 RX ORDER — PROPOFOL 10 MG/ML
INJECTION, EMULSION INTRAVENOUS CONTINUOUS PRN
Status: DISCONTINUED | OUTPATIENT
Start: 2018-07-02 | End: 2018-07-02

## 2018-07-02 RX ADMIN — LIDOCAINE HYDROCHLORIDE 1 ML: 10 INJECTION, SOLUTION EPIDURAL; INFILTRATION; INTRACAUDAL; PERINEURAL at 09:21

## 2018-07-02 RX ADMIN — PROPOFOL 125 MCG/KG/MIN: 10 INJECTION, EMULSION INTRAVENOUS at 11:04

## 2018-07-02 RX ADMIN — SODIUM CHLORIDE, POTASSIUM CHLORIDE, SODIUM LACTATE AND CALCIUM CHLORIDE: 600; 310; 30; 20 INJECTION, SOLUTION INTRAVENOUS at 09:21

## 2018-07-02 RX ADMIN — PROPOFOL 40 MG: 10 INJECTION, EMULSION INTRAVENOUS at 11:06

## 2018-07-02 NOTE — IP AVS SNAPSHOT
MRN:[id number]                      After Visit Summary   7/2/2018    Zina Harrell    MRN: [id number]           Thank you!     Thank you for choosing [facility] for your care. Our goal is always to provide you with excellent care. Hearing back from our patients is one way we can continue to improve our services. Please take a few minutes to complete the written survey that you may receive in the mail after you visit with us. Thank you!        Patient Information     Date Of Birth          1965        About your hospital stay     You were admitted on:  July 2, 2018 You last received care in the:  [facility]    You were discharged on:  July 2, 2018       Who to Call     For medical emergencies, please call 911.  For non-urgent questions about your medical care, please call your primary care provider or clinic, [phone]  For questions related to your surgery, please call your surgery clinic        Attending Provider     Provider Sundeep Gleason MD Family Practice       Primary Care Provider Office Phone # Fax #    Lebron Childers [phone]      Further instructions from your care team         [facility]    Home Care Following Endoscopy          Activity:    You have just undergone an endoscopic procedure usually performed with conscious sedation.  Do not work or operate machinery (including a car) for at least 12 hours.      I encourage you to walk and attempt to pass this air as soon as possible.    Diet:    Return to the diet you were on before your procedure but eat lightly for the first 12-24 hours.    Drink plenty of water.    Resume any regular medications unless otherwise advised by your physician.  Please begin any new medication prescribed as a result of your procedure as directed by your physician.     If you had any biopsy or polyp removed please refrain from aspirin or aspirin products for 2 days.   "If on Coumadin please restart as instructed by your physician.   Pain:    You may take Tylenol as needed for pain.  Expected Recovery:    You can expect some mild abdominal fullness and/or discomfort due to the air used to inflate your intestinal tract.     Call Your Physician if You Have:      After Colonoscopy:  o Worsening persisting abdominal pain which is worse with activity.  o Fevers (>101 degrees F), chills or shakes.  o Passage of continued blood with bowel movements.     Any questions or concerns about your recovery, please call [phone] or after hours [phone] Nurse Advice Line.    Follow-up Care:    You should receive a call or letter with your results within 1 week. Please call if you have not received a notification of your results.    If asked to return to clinic please make an appointment 1 week after your procedure.  Call [phone].    Pending Results     No orders found from 2018 to 7/3/2018.            Admission Information     Date & Time Provider Department Dept. Phone    2018 Sundeep [facility] MD [facility] [phone]      Your Vitals Were     Blood Pressure Pulse Temperature Respirations Last Period Pulse Oximetry    119/80 84 98.1  F (36.7  C) (Oral) 16 2016 (Within Months) 99%      MyChart Information     [employer] lets you send messages to your doctor, view your test results, renew your prescriptions, schedule appointments and more. To sign up, go to [email or web address] . Click on \"Log in\" on the left side of the screen, which will take you to the Welcome page. Then click on \"Sign up Now\" on the right side of the page.     You will be asked to enter the access code listed below, as well as some personal information. Please follow the directions to create your username and password.     Your access code is: [email or web address]  Expires: 2018  1:02 PM     Your access code will  in 90 days. If you need help or a new code, please call your " [facility] or [phone].        Care EveryWhere ID     This is your Care EveryWhere ID. This could be used by other organizations to access your [facility] medical records  [id number]        Equal Access to Services     RAFAL STAPLETON : Hadii aad ku hadgelyluther Sobrandiali, waaxda luqadaha, qaybta kaalmada adenidiada, feroz burroughsaugustina pateljanes weston winston chaves. So [facility] [phone].    ATENCIÓN: Si habla español, tiene a bay disposición servicios gratuitos de asistencia lingüística. Llame al [phone].    We comply with applicable federal civil rights laws and [place] laws. We do not discriminate on the basis of race, color, national origin, age, disability, sex, sexual orientation, or gender identity.               Review of your medicines      UNREVIEWED medicines. Ask your doctor about these medicines        Dose / Directions    albuterol 108 (90 Base) MCG/ACT Inhaler   Commonly known as:  VENTOLIN HFA   Used for:  Intermittent asthma, well controlled, SOB (shortness of breath)        Dose:  2 puff   Inhale 2 puffs into the lungs every 4 hours as needed for shortness of breath / dyspnea or wheezing SHAKE WELL AND INHALE 2 PUFFS INTO THE LUNGS AS NEEDED   Quantity:  3 Inhaler   Refills:  6       cetirizine 10 MG tablet   Commonly known as:  zyrTEC   Used for:  Chronic seasonal allergic rhinitis, unspecified trigger        Dose:  10 mg   Take 1 tablet (10 mg) by mouth every evening   Quantity:  90 tablet   Refills:  3       fluticasone 110 MCG/ACT Inhaler   Commonly known as:  FLOVENT HFA   Used for:  Intermittent asthma, well controlled        Dose:  2 puff   Inhale 2 puffs into the lungs 2 times daily   Quantity:  3 Inhaler   Refills:  3       guaiFENesin 600 MG 12 hr tablet   Commonly known as:  MUCINEX   Used for:  Cough        Dose:  600 mg   Take 1 tablet (600 mg) by mouth 2 times daily as needed for congestion   Quantity:  60 tablet   Refills:  3       loratadine 10 MG tablet   Commonly known as:  CLARITIN         Dose:  10 mg   Take 10 mg by mouth daily   Refills:  0       Multi-vitamin Tabs tablet        Dose:  1 tablet   Take 1 tablet by mouth daily   Refills:  0       omega-3 acid ethyl esters 1 g capsule   Commonly known as:  Lovaza        Dose:  2 g   Take 2 g by mouth 2 times daily   Refills:  0                Protect others around you: Learn how to safely use, store and throw away your medicines at www.disposemymeds.org.             Medication List: This is a list of all your medications and when to take them. Check marks below indicate your daily home schedule. Keep this list as a reference.      Medications           Morning Afternoon Evening Bedtime As Needed    albuterol 108 (90 Base) MCG/ACT Inhaler   Commonly known as:  VENTOLIN HFA   Inhale 2 puffs into the lungs every 4 hours as needed for shortness of breath / dyspnea or wheezing SHAKE WELL AND INHALE 2 PUFFS INTO THE LUNGS AS NEEDED                                cetirizine 10 MG tablet   Commonly known as:  zyrTEC   Take 1 tablet (10 mg) by mouth every evening                                fluticasone 110 MCG/ACT Inhaler   Commonly known as:  FLOVENT HFA   Inhale 2 puffs into the lungs 2 times daily                                guaiFENesin 600 MG 12 hr tablet   Commonly known as:  MUCINEX   Take 1 tablet (600 mg) by mouth 2 times daily as needed for congestion                                loratadine 10 MG tablet   Commonly known as:  CLARITIN   Take 10 mg by mouth daily                                Multi-vitamin Tabs tablet   Take 1 tablet by mouth daily                                omega-3 acid ethyl esters 1 g capsule   Commonly known as:  Lovaza   Take 2 g by mouth 2 times daily

## 2018-07-02 NOTE — ANESTHESIA POSTPROCEDURE EVALUATION
Patient: Zina Harrell    Procedure(s):  COLONOSCOPY - Wound Class: II-Clean Contaminated    Diagnosis:Special screening for malignant neoplasm of colon  Diagnosis Additional Information: No value filed.    Anesthesia Type:  MAC    Note:  Anesthesia Post Evaluation    Patient location during evaluation: Phase 2  Patient participation: Able to fully participate in evaluation  Level of consciousness: awake and alert  Pain management: adequate  Airway patency: patent  Cardiovascular status: acceptable  Respiratory status: acceptable  Hydration status: acceptable  PONV: none             Last vitals:  Vitals:    07/02/18 1130 07/02/18 1145 07/02/18 1200   BP: 116/76 (!) 126/91 124/84   Pulse:      Resp:      Temp:      SpO2: 100% 100%          Electronically Signed By: ANIYAH Finney CRNA  July 2, 2018  1:07 PM

## 2018-07-02 NOTE — ANESTHESIA CARE TRANSFER NOTE
Patient: Zina Harrell    Procedure(s):  COLONOSCOPY - Wound Class: II-Clean Contaminated    Diagnosis: Special screening for malignant neoplasm of colon  Diagnosis Additional Information: No value filed.    Anesthesia Type:   MAC     Note:  Anesthesia Care Transfer Notewriter    Vitals: (Last set prior to Anesthesia Care Transfer)    CRNA VITALS  7/2/2018 1055 - 7/2/2018 1145      7/2/2018             Pulse: 78    SpO2: 100 %    Resp Rate (observed): 12                Electronically Signed By: ANIYAH Finney CRNA  July 2, 2018  11:45 AM

## 2018-07-02 NOTE — ANESTHESIA PREPROCEDURE EVALUATION
Anesthesia Evaluation     . Pt has had prior anesthetic.            ROS/MED HX    ENT/Pulmonary:     (+)Intermittent asthma , . .    Neurologic:  - neg neurologic ROS     Cardiovascular:  - neg cardiovascular ROS   (+) ----. : . . . :. . No previous cardiac testing       METS/Exercise Tolerance:     Hematologic:  - neg hematologic  ROS       Musculoskeletal:  - neg musculoskeletal ROS       GI/Hepatic:  - neg GI/hepatic ROS       Renal/Genitourinary:  - ROS Renal section negative       Endo:  - neg endo ROS       Psychiatric:     (+) psychiatric history anxiety      Infectious Disease:  - neg infectious disease ROS       Malignancy:      - no malignancy   Other: Comment: Post menopasual   - neg other ROS                 Physical Exam  Normal systems: cardiovascular and pulmonary    Airway   Mallampati: II  TM distance: >3 FB  Neck ROM: full    Dental   Comment: Poor denition    Cardiovascular       Pulmonary                     Anesthesia Plan      History & Physical Review  History and physical reviewed and following examination; no interval change.    ASA Status:  2 .    NPO Status:  > 8 hours    Plan for MAC with Intravenous and Propofol induction. Maintenance will be TIVA.  Reason for MAC:  Deep or markedly invasive procedure (G8)  PONV prophylaxis:  Ondansetron (or other 5HT-3)       Postoperative Care      Consents  Anesthetic plan, risks, benefits and alternatives discussed with:  Patient.  Use of blood products discussed: No .   .                          .

## 2018-07-02 NOTE — H&P
Pre-Endoscopy History and Physical     Zina Harrell MRN# [id number]   YOB: 1965 Age: 52 year old     Date of Procedure: 7/2/2018  Primary care provider: Lebron Childers  Type of Endoscopy: colonoscopy  Type of Anesthesia Anticipated: MAC    HPI:    Zina is a 52 year old female who will be undergoing the above procedure.      Zina is feeling well today. Can get up a single flight of stairs without dyspnea. Estimated METS > 4. The risks and benefits of the procedure and the sedation options and risks were discussed with the patient. These include infection, bleeding, and small risk of colon perforation (1/1000 to 1/68474 depending on patient characteristics and type of procedure). Zina was also explained to alternatives for colo-rectal screening. All questions were answered and informed consent was obtained.      Patient Active Problem List   Diagnosis     Asthma, intermittent controlled     CARDIOVASCULAR SCREENING; LDL GOAL LESS THAN 160     Tobacco abuse     Cervical high risk HPV (human papillomavirus) test positive     Adjustment disorder with mixed anxiety and depressed mood        Prescriptions Prior to Admission   Medication Sig Dispense Refill Last Dose     albuterol (VENTOLIN HFA) 108 (90 BASE) MCG/ACT Inhaler Inhale 2 puffs into the lungs every 4 hours as needed for shortness of breath / dyspnea or wheezing SHAKE WELL AND INHALE 2 PUFFS INTO THE LUNGS AS NEEDED 3 Inhaler 6 7/2/2018     fluticasone (FLOVENT HFA) 110 MCG/ACT Inhaler Inhale 2 puffs into the lungs 2 times daily 3 Inhaler 3 7/2/2018     loratadine (CLARITIN) 10 MG tablet Take 10 mg by mouth daily   7/1/2018     multivitamin, therapeutic with minerals (MULTI-VITAMIN) TABS tablet Take 1 tablet by mouth daily   6/20/2018     omega-3 acid ethyl esters (LOVAZA) 1 g capsule Take 2 g by mouth 2 times daily   6/20/2018     cetirizine (ZYRTEC) 10 MG tablet Take 1 tablet (10 mg) by mouth every evening 90 tablet 3 Taking      "guaiFENesin (MUCINEX) 600 MG 12 hr tablet Take 1 tablet (600 mg) by mouth 2 times daily as needed for congestion 60 tablet 3 More than a month at Unknown time        REVIEW OF SYSTEMS:     5 point ROS negative except as noted above in HPI, including Gen., Resp., CV, GI &  system review.      PHYSICAL EXAM:   /80  Pulse 84  Temp 98.1  F (36.7  C) (Oral)  Resp 16  LMP 03/14/2016 (Within Months)  SpO2 99%   Estimated body mass index is 29.58 kg/(m^2) as calculated from the following:    Height as of 5/7/18: 5' 3\" (1.6 m).    Weight as of 5/7/18: 167 lb (75.8 kg).    GENERAL APPEARANCE: alert and no distress  RESP: lungs clear and unlabored  CV: regular  ABD: soft, nt/nd    DIAGNOSTICS:    Not indicated      IMPRESSION   ASA Class 2 - Mild systemic disease        PLAN:     Plan for colonoscopy. No medical contraindications to proceed, or further work up needed. We discussed the risks, benefits and alternatives and the patient wished to proceed.    The above has been forwarded to the consulting provider.      Signed Electronically by: Sundeep Boston  July 2, 2018     "

## 2018-07-02 NOTE — IP AVS SNAPSHOT
[facility]    [address] [address]    Phone:  [phone]                                       After Visit Summary   7/2/2018    Zina Harrell    MRN: [id number]           After Visit Summary Signature Page     I have received my discharge instructions, and my questions have been answered. I have discussed any challenges I see with this plan with the nurse or doctor.    ..........................................................................................................................................  Patient/Patient Representative Signature      ..........................................................................................................................................  Patient Representative Print Name and Relationship to Patient    ..................................................               ................................................  Date                                            Time    ..........................................................................................................................................  Reviewed by Signature/Title    ...................................................              ..............................................  Date                                                            Time

## 2018-07-02 NOTE — DISCHARGE INSTRUCTIONS
[facility]    Home Care Following Endoscopy          Activity:    You have just undergone an endoscopic procedure usually performed with conscious sedation.  Do not work or operate machinery (including a car) for at least 12 hours.      I encourage you to walk and attempt to pass this air as soon as possible.    Diet:    Return to the diet you were on before your procedure but eat lightly for the first 12-24 hours.    Drink plenty of water.    Resume any regular medications unless otherwise advised by your physician.  Please begin any new medication prescribed as a result of your procedure as directed by your physician.     If you had any biopsy or polyp removed please refrain from aspirin or aspirin products for 2 days.  If on Coumadin please restart as instructed by your physician.   Pain:    You may take Tylenol as needed for pain.  Expected Recovery:    You can expect some mild abdominal fullness and/or discomfort due to the air used to inflate your intestinal tract.     Call Your Physician if You Have:      After Colonoscopy:  o Worsening persisting abdominal pain which is worse with activity.  o Fevers (>101 degrees F), chills or shakes.  o Passage of continued blood with bowel movements.     Any questions or concerns about your recovery, please call [phone] or after hours [phone] Nurse Advice Line.    Follow-up Care:    You should receive a call or letter with your results within 1 week. Please call if you have not received a notification of your results.    If asked to return to clinic please make an appointment 1 week after your procedure.  Call [phone].

## 2018-07-26 DIAGNOSIS — R06.02 SOB (SHORTNESS OF BREATH): ICD-10-CM

## 2018-07-26 DIAGNOSIS — J45.20 INTERMITTENT ASTHMA, WELL CONTROLLED: ICD-10-CM

## 2018-07-26 NOTE — TELEPHONE ENCOUNTER
"Requested Prescriptions   Pending Prescriptions Disp Refills     VENTOLIN  (90 Base) MCG/ACT Inhaler [Pharmacy Med Name: VENTOLIN HFA 90 MCG INHALER 108 (90 BAS AERO] 54 g 6    Last Written Prescription Date:  07/18/2017  Last Fill Quantity: 3,  # refills: 6   Last office visit: 5/7/2018 with prescribing provider:  05/07/2018   Future Office Visit:     Sig: INHALE 2 PUFFS INTO THE LUNGS EVERY 4 HOURS AS NEEDED FOR SHORTNESS OF BREATH/DYSPNEA OR WHEEZING    Asthma Maintenance Inhalers - Anticholinergics Passed    7/26/2018  3:55 PM       Passed - Patient is age 12 years or older       Passed - Asthma control assessment score within normal limits in last 6 months    Please review ACT score.          Passed - Recent (6 mo) or future (30 days) visit within the authorizing provider's specialty    Patient had office visit in the last 6 months or has a visit in the next 30 days with authorizing provider or within the authorizing provider's specialty.  See \"Patient Info\" tab in inbasket, or \"Choose Columns\" in Meds & Orders section of the refill encounter.              "

## 2018-07-30 RX ORDER — ALBUTEROL SULFATE 90 UG/1
AEROSOL, METERED RESPIRATORY (INHALATION)
Qty: 54 G | Refills: 2 | Status: SHIPPED | OUTPATIENT
Start: 2018-07-30 | End: 2019-06-04

## 2018-07-30 NOTE — TELEPHONE ENCOUNTER
ACT Total Scores 4/5/2017 11/27/2017 5/7/2018   ACT TOTAL SCORE - - -   ASTHMA ER VISITS - - -   ASTHMA HOSPITALIZATIONS - - -   ACT TOTAL SCORE (Goal Greater than or Equal to 20) 21 23 20   In the past 12 months, how many times did you visit the emergency room for your asthma without being admitted to the hospital? 0 1 0   In the past 12 months, how many times were you hospitalized overnight because of your asthma? 1 1 0     Prescription approved per [facility] Refill Protocol.  TRACIE Chowdary

## 2018-12-20 DIAGNOSIS — J30.2 CHRONIC SEASONAL ALLERGIC RHINITIS: ICD-10-CM

## 2018-12-21 RX ORDER — CETIRIZINE HYDROCHLORIDE 10 MG/1
TABLET ORAL
Qty: 30 TABLET | Refills: 5 | Status: SHIPPED | OUTPATIENT
Start: 2018-12-21 | End: 2019-07-15

## 2018-12-21 NOTE — TELEPHONE ENCOUNTER
"zyrtec  Last Written Prescription Date:  11/27/2017  Last Fill Quantity: 90,  # refills: 3   Last office visit: 5/7/2018 with prescribing provider:  5/7/18   Future Office Visit:      Requested Prescriptions   Signed Prescriptions Disp Refills     cetirizine (ZYRTEC) 10 MG tablet 30 tablet 5     Sig: TAKE ONE TABLET BY MOUTH EACH EVENING    Antihistamines Protocol Passed - 12/20/2018  9:11 AM       Passed - Patient is 3-64 years of age    Apply weight-based dosing for peds patients age 3 - 12 years of age.    Forward request to provider for patients under the age of 3 or over the age of 64.         Passed - Recent (12 mo) or future (30 days) visit within the authorizing provider's specialty    Patient had office visit in the last 12 months or has a visit in the next 30 days with authorizing provider or within the authorizing provider's specialty.  See \"Patient Info\" tab in inbasket, or \"Choose Columns\" in Meds & Orders section of the refill encounter.                Prescription approved per [facility] Refill Protocol.      Gabrielle Velázquez RN. . .  12/21/2018, 3:46 PM    "

## 2019-03-13 ENCOUNTER — ANCILLARY PROCEDURE (OUTPATIENT)
Dept: MAMMOGRAPHY | Facility: OTHER | Age: 54
End: 2019-03-13
Attending: INTERNAL MEDICINE
Payer: COMMERCIAL

## 2019-03-13 DIAGNOSIS — Z12.31 VISIT FOR SCREENING MAMMOGRAM: ICD-10-CM

## 2019-03-13 PROCEDURE — 77067 SCR MAMMO BI INCL CAD: CPT | Mod: TC

## 2019-03-15 ENCOUNTER — OFFICE VISIT (OUTPATIENT)
Dept: INTERNAL MEDICINE | Facility: CLINIC | Age: 54
End: 2019-03-15
Payer: COMMERCIAL

## 2019-03-15 ENCOUNTER — HOSPITAL ENCOUNTER (OUTPATIENT)
Dept: GENERAL RADIOLOGY | Facility: CLINIC | Age: 54
Discharge: HOME | End: 2019-03-15
Attending: INTERNAL MEDICINE | Admitting: INTERNAL MEDICINE
Payer: COMMERCIAL

## 2019-03-15 VITALS
WEIGHT: 165 LBS | TEMPERATURE: 96 F | DIASTOLIC BLOOD PRESSURE: 74 MMHG | RESPIRATION RATE: 16 BRPM | HEIGHT: 63 IN | OXYGEN SATURATION: 100 % | HEART RATE: 96 BPM | SYSTOLIC BLOOD PRESSURE: 112 MMHG | BODY MASS INDEX: 29.23 KG/M2

## 2019-03-15 DIAGNOSIS — M79.642 PAIN IN BOTH HANDS: ICD-10-CM

## 2019-03-15 DIAGNOSIS — M79.641 PAIN IN BOTH HANDS: ICD-10-CM

## 2019-03-15 DIAGNOSIS — Z13.6 CARDIOVASCULAR SCREENING; LDL GOAL LESS THAN 160: ICD-10-CM

## 2019-03-15 DIAGNOSIS — Z72.0 TOBACCO ABUSE: ICD-10-CM

## 2019-03-15 DIAGNOSIS — F43.23 ADJUSTMENT DISORDER WITH MIXED ANXIETY AND DEPRESSED MOOD: Primary | ICD-10-CM

## 2019-03-15 LAB
ALBUMIN SERPL-MCNC: 3.6 G/DL (ref 3.4–5)
ALP SERPL-CCNC: 92 U/L (ref 40–150)
ALT SERPL W P-5'-P-CCNC: 37 U/L (ref 0–50)
ANION GAP SERPL CALCULATED.3IONS-SCNC: 5 MMOL/L (ref 3–14)
AST SERPL W P-5'-P-CCNC: 30 U/L (ref 0–45)
BILIRUB SERPL-MCNC: 0.4 MG/DL (ref 0.2–1.3)
BUN SERPL-MCNC: 10 MG/DL (ref 7–30)
CALCIUM SERPL-MCNC: 8.5 MG/DL (ref 8.5–10.1)
CHLORIDE SERPL-SCNC: 108 MMOL/L (ref 94–109)
CHOLEST SERPL-MCNC: 120 MG/DL
CO2 SERPL-SCNC: 29 MMOL/L (ref 20–32)
CREAT SERPL-MCNC: 0.88 MG/DL (ref 0.52–1.04)
ERYTHROCYTE [DISTWIDTH] IN BLOOD BY AUTOMATED COUNT: 14 % (ref 10–15)
GFR SERPL CREATININE-BSD FRML MDRD: 76 ML/MIN/{1.73_M2}
GLUCOSE SERPL-MCNC: 106 MG/DL (ref 70–99)
HCT VFR BLD AUTO: 40.8 % (ref 35–47)
HDLC SERPL-MCNC: 89 MG/DL
HGB BLD-MCNC: 13.4 G/DL (ref 11.7–15.7)
LDLC SERPL CALC-MCNC: 25 MG/DL
MCH RBC QN AUTO: 32.1 PG (ref 26.5–33)
MCHC RBC AUTO-ENTMCNC: 32.8 G/DL (ref 31.5–36.5)
MCV RBC AUTO: 98 FL (ref 78–100)
NONHDLC SERPL-MCNC: 31 MG/DL
PLATELET # BLD AUTO: 277 10E9/L (ref 150–450)
POTASSIUM SERPL-SCNC: 4.2 MMOL/L (ref 3.4–5.3)
PROT SERPL-MCNC: 7.2 G/DL (ref 6.8–8.8)
RBC # BLD AUTO: 4.17 10E12/L (ref 3.8–5.2)
SODIUM SERPL-SCNC: 142 MMOL/L (ref 133–144)
TRIGL SERPL-MCNC: 29 MG/DL
TSH SERPL DL<=0.005 MIU/L-ACNC: 0.81 MU/L (ref 0.4–4)
WBC # BLD AUTO: 5.3 10E9/L (ref 4–11)

## 2019-03-15 PROCEDURE — 73130 X-RAY EXAM OF HAND: CPT | Mod: TC

## 2019-03-15 PROCEDURE — 36415 COLL VENOUS BLD VENIPUNCTURE: CPT | Performed by: INTERNAL MEDICINE

## 2019-03-15 PROCEDURE — 99214 OFFICE O/P EST MOD 30 MIN: CPT | Performed by: INTERNAL MEDICINE

## 2019-03-15 PROCEDURE — 84443 ASSAY THYROID STIM HORMONE: CPT | Performed by: INTERNAL MEDICINE

## 2019-03-15 PROCEDURE — 80053 COMPREHEN METABOLIC PANEL: CPT | Performed by: INTERNAL MEDICINE

## 2019-03-15 PROCEDURE — 80061 LIPID PANEL: CPT | Performed by: INTERNAL MEDICINE

## 2019-03-15 PROCEDURE — 85027 COMPLETE CBC AUTOMATED: CPT | Performed by: INTERNAL MEDICINE

## 2019-03-15 ASSESSMENT — ENCOUNTER SYMPTOMS
HEMATURIA: 0
ARTHRALGIAS: 0
PALPITATIONS: 0
PARESTHESIAS: 0
NERVOUS/ANXIOUS: 0
CHILLS: 0
SHORTNESS OF BREATH: 0
FEVER: 0
DIARRHEA: 0
FREQUENCY: 0
DYSURIA: 0
HEARTBURN: 1
WEAKNESS: 0
HEADACHES: 1
HEMATOCHEZIA: 0
BREAST MASS: 0
EYE PAIN: 0
DIZZINESS: 1
NAUSEA: 0
ABDOMINAL PAIN: 0
CONSTIPATION: 1
JOINT SWELLING: 0
SORE THROAT: 0
COUGH: 1
MYALGIAS: 1

## 2019-03-15 ASSESSMENT — PATIENT HEALTH QUESTIONNAIRE - PHQ9
SUM OF ALL RESPONSES TO PHQ QUESTIONS 1-9: 9
SUM OF ALL RESPONSES TO PHQ QUESTIONS 1-9: 9
10. IF YOU CHECKED OFF ANY PROBLEMS, HOW DIFFICULT HAVE THESE PROBLEMS MADE IT FOR YOU TO DO YOUR WORK, TAKE CARE OF THINGS AT HOME, OR GET ALONG WITH OTHER PEOPLE: VERY DIFFICULT

## 2019-03-15 ASSESSMENT — MIFFLIN-ST. JEOR: SCORE: 1332.57

## 2019-03-15 ASSESSMENT — PAIN SCALES - GENERAL: PAINLEVEL: NO PAIN (0)

## 2019-03-15 NOTE — PROGRESS NOTES
Zina Harrell is a 53 year old female who presents with anxiety and issues going on.      Having some issues with her hands and knuckles being enlarged.      Depression and easily crying.  Not working,     Her dad is now living with her, he has dementia.  His hearing aids aren't working.     Mammogram was ok this week.      Wonder about menapause as well.        Answers for HPI/ROS submitted by the patient on 3/15/2019   Annual Exam:  Frequency of exercise:: 1 day/week  Getting at least 3 servings of Calcium per day:: Yes  Diet:: Regular (no restrictions)  Taking medications regularly:: Yes  Medication side effects:: None  Bi-annual eye exam:: Yes  Dental care twice a year:: NO  Sleep apnea or symptoms of sleep apnea:: Daytime drowsiness  Positive for the following: congestion, constipation, cough, dizziness, headaches, heartburn, mood changes, myalgias  Negative for the following: abdominal pain, Blood in stool, Blood in urine, chest pain, chills, diarrhea, ear pain, eye pain, nervous/anxious, fever, frequency, genital sores, hearing loss, arthralgias, joint swelling, peripheral edema, nausea, dysuria, palpitations, Skin sensation changes, sore throat, urgency, rash, shortness of breath, visual disturbance, weakness  pelvic pain: No  vaginal bleeding: No  vaginal discharge: No  tenderness: Yes  breast mass: No  breast discharge: No  Additional concerns today:: No  Duration of exercise:: Less than 15 minutes  If you checked off any problems, how difficult have these problems made it for you to do your work, take care of things at home, or get along with other people?: Very difficult  PHQ9 TOTAL SCORE: 9    Past Medical History:   Diagnosis Date     Asthma, intermittent controlled age 8    age 16 hospitalized     High risk HPV infection 6/5/15    normal pap. colp 8/2015 WNL     Current Outpatient Medications   Medication     FLUoxetine (PROZAC) 20 MG capsule     fluticasone (FLOVENT HFA) 110 MCG/ACT Inhaler      "loratadine (CLARITIN) 10 MG tablet     multivitamin, therapeutic with minerals (MULTI-VITAMIN) TABS tablet     omega-3 acid ethyl esters (LOVAZA) 1 g capsule     VENTOLIN  (90 Base) MCG/ACT Inhaler     cetirizine (ZYRTEC) 10 MG tablet     guaiFENesin (MUCINEX) 600 MG 12 hr tablet     No current facility-administered medications for this visit.      Social History     Tobacco Use     Smoking status: Current Every Day Smoker     Packs/day: 0.50     Smokeless tobacco: Never Used     Tobacco comment: smoked since age 20   Substance Use Topics     Alcohol use: Yes     Comment: rarely     Drug use: No     Physical Exam  /74 (BP Location: Right arm, Patient Position: Sitting, Cuff Size: Adult Regular)   Pulse 96   Temp 96  F (35.6  C) (Temporal)   Resp 16   Ht 1.6 m (5' 3\")   Wt 74.8 kg (165 lb)   LMP 03/14/2016 (Within Months)   SpO2 100%   BMI 29.23 kg/m    General Appearance-anxious, crying at times.  Fingers are slightly swollen    ASSESSMENT:  Patient that was here for a physical but has too much going on.  She is depressed but not suicidal, anxious.  Stress with no job, her dad has dementia and just got dumped on her by her sister. We will try to help her with him and his health issues.       For depression and anxiety will start her on prozac.  See her back for a physical in a month and may adjust dosage then.     Will check her physical fasting labs today.    I spent greater than 50% of this 25 minute visit in counseling and coordination of care of anxiety    Electronically signed by Lebron Childers MD  .        "

## 2019-03-15 NOTE — PROGRESS NOTES
"   SUBJECTIVE:   CC: Zina Harrell is an 53 year old woman who presents for preventive health visit.     Physical   Annual:     Getting at least 3 servings of Calcium per day:  Yes    Bi-annual eye exam:  Yes    Dental care twice a year:  NO    Sleep apnea or symptoms of sleep apnea:  Daytime drowsiness    Diet:  Regular (no restrictions)    Frequency of exercise:  1 day/week    Duration of exercise:  Less than 15 minutes    Taking medications regularly:  Yes    Medication side effects:  None    Additional concerns today:  No    PHQ-2 Total Score: 4          {additional problems to add (Optional):900542}    Today's PHQ-2 Score:   PHQ-2 ( 1999 [employer]) 3/15/2019   Q1: Little interest or pleasure in doing things 2   Q2: Feeling down, depressed or hopeless 2   PHQ-2 Score 4   Q1: Little interest or pleasure in doing things More than half the days   Q2: Feeling down, depressed or hopeless More than half the days   PHQ-2 Score 4       Abuse: Current or Past(Physical, Sexual or Emotional)- No  Do you feel safe in your environment? Yes    Social History     Tobacco Use     Smoking status: Current Every Day Smoker     Packs/day: 0.50     Smokeless tobacco: Never Used     Tobacco comment: smoked since age 20   Substance Use Topics     Alcohol use: Yes     Comment: rarely     Alcohol Use 3/15/2019   If you drink alcohol do you typically have greater than 3 drinks per day OR greater than 7 drinks per week? No       Reviewed orders with patient.  Reviewed health maintenance and updated orders accordingly - {Yes/No:122866::\"Yes\"}  {Chronicprobdata (Optional):195219}    {Mammo Decision Support (Optional):556510}    Pertinent mammograms are reviewed under the imaging tab.  History of abnormal Pap smear: {PAP HX:745264}  PAP / HPV Latest Ref Rng & Units 11/27/2017 9/14/2016 8/6/2015   PAP - NIL NIL NIL   HPV 16 DNA NEG:Negative Negative Negative Negative   HPV 18 DNA NEG:Negative Negative Negative Negative   OTHER HR HPV " "NEG:Negative Negative Negative Negative     Reviewed and updated as needed this visit by clinical staff  Allergies  Meds         Reviewed and updated as needed this visit by Provider        {HISTORY OPTIONS (Optional):420100}    Review of Systems   Constitutional: Negative for chills and fever.   HENT: Positive for congestion. Negative for ear pain, hearing loss and sore throat.    Eyes: Negative for pain and visual disturbance.   Respiratory: Positive for cough. Negative for shortness of breath.    Cardiovascular: Negative for chest pain, palpitations and peripheral edema.   Gastrointestinal: Positive for constipation and heartburn. Negative for abdominal pain, diarrhea, hematochezia and nausea.   Breasts:  Positive for tenderness. Negative for breast mass and discharge.   Genitourinary: Negative for dysuria, frequency, genital sores, hematuria, pelvic pain, urgency, vaginal bleeding and vaginal discharge.   Musculoskeletal: Positive for myalgias. Negative for arthralgias and joint swelling.   Skin: Negative for rash.   Neurological: Positive for dizziness and headaches. Negative for weakness and paresthesias.   Psychiatric/Behavioral: Positive for mood changes. The patient is not nervous/anxious.      {FEMALE ROS (Optional):890312}     OBJECTIVE:   LMP 03/14/2016 (Within Months)   Physical Exam  {Exam Choices (Optional):439077}    {Diagnostic Test Results (Optional):414150::\"Diagnostic Test Results:\",\"none \"}    ASSESSMENT/PLAN:   {Diag Picklist:692404}    COUNSELING:  {FEMALE COUNSELING MESSAGES:970659::\"Reviewed preventive health counseling, as reflected in patient instructions\"}    BP Readings from Last 1 Encounters:   07/02/18 124/84     Estimated body mass index is 29.58 kg/m  as calculated from the following:    Height as of 5/7/18: 1.6 m (5' 3\").    Weight as of 5/7/18: 75.8 kg (167 lb).    {BP Counseling- Complete if BP >= 120/80  (Optional):181438}  {Weight Management Plan (ACO) Complete if BMI is " abnormal-  Ages 18-64  BMI >24.9.  Age 65+ with BMI <23 or >30 (Optional):569723}     reports that she has been smoking.  She has been smoking about 0.50 packs per day. she has never used smokeless tobacco.  {Tobacco Cessation -- Complete if patient is a smoker (Optional):350412}    Counseling Resources:  ATP IV Guidelines  Pooled Cohorts Equation Calculator  Breast Cancer Risk Calculator  FRAX Risk Assessment  ICSI Preventive Guidelines  Dietary Guidelines for Americans, 2010  USDA's MyPlate  ASA Prophylaxis  Lung CA Screening    Lebron Childers MD  [facility]

## 2019-03-16 ASSESSMENT — ASTHMA QUESTIONNAIRES: ACT_TOTALSCORE: 21

## 2019-03-16 ASSESSMENT — PATIENT HEALTH QUESTIONNAIRE - PHQ9: SUM OF ALL RESPONSES TO PHQ QUESTIONS 1-9: 9

## 2019-03-18 ENCOUNTER — TELEPHONE (OUTPATIENT)
Dept: INTERNAL MEDICINE | Facility: CLINIC | Age: 54
End: 2019-03-18

## 2019-03-18 DIAGNOSIS — M79.642 BILATERAL HAND PAIN: Primary | ICD-10-CM

## 2019-03-18 DIAGNOSIS — M79.641 BILATERAL HAND PAIN: Primary | ICD-10-CM

## 2019-03-18 NOTE — TELEPHONE ENCOUNTER
----- Message from Lebron Childers MD sent at 3/18/2019  9:12 AM CDT -----  Xray does show inflammatory degenerative changes.  Please order a Rheumatoid factor, esr, crp and eric on her.

## 2019-03-19 DIAGNOSIS — M79.642 BILATERAL HAND PAIN: ICD-10-CM

## 2019-03-19 DIAGNOSIS — M79.641 BILATERAL HAND PAIN: ICD-10-CM

## 2019-03-19 LAB
CRP SERPL-MCNC: <2.9 MG/L (ref 0–8)
ERYTHROCYTE [SEDIMENTATION RATE] IN BLOOD BY WESTERGREN METHOD: 7 MM/H (ref 0–30)

## 2019-03-19 PROCEDURE — 86038 ANTINUCLEAR ANTIBODIES: CPT | Performed by: INTERNAL MEDICINE

## 2019-03-19 PROCEDURE — 86039 ANTINUCLEAR ANTIBODIES (ANA): CPT | Performed by: FAMILY MEDICINE

## 2019-03-19 PROCEDURE — 86140 C-REACTIVE PROTEIN: CPT | Performed by: INTERNAL MEDICINE

## 2019-03-19 PROCEDURE — 85652 RBC SED RATE AUTOMATED: CPT | Performed by: INTERNAL MEDICINE

## 2019-03-19 PROCEDURE — 36415 COLL VENOUS BLD VENIPUNCTURE: CPT | Performed by: INTERNAL MEDICINE

## 2019-03-19 PROCEDURE — 86431 RHEUMATOID FACTOR QUANT: CPT | Performed by: INTERNAL MEDICINE

## 2019-03-20 LAB — RHEUMATOID FACT SER NEPH-ACNC: <20 IU/ML (ref 0–20)

## 2019-03-21 LAB
ANA PAT SER IF-IMP: ABNORMAL
ANA SER QL IF: ABNORMAL
ANA TITR SER IF: ABNORMAL {TITER}

## 2019-04-04 DIAGNOSIS — J45.20 INTERMITTENT ASTHMA, WELL CONTROLLED: ICD-10-CM

## 2019-04-08 RX ORDER — DEXAMETHASONE 4 MG/1
TABLET ORAL
Qty: 36 G | Refills: 2 | Status: SHIPPED | OUTPATIENT
Start: 2019-04-08 | End: 2020-03-25

## 2019-04-08 NOTE — TELEPHONE ENCOUNTER
"Prescription approved per RN refill protocol.  Phoebe Rausch RN, BSN    Flovent  Last Written Prescription Date:  2/28/2018  Last Fill Quantity: 3,  # refills: 3   Last office visit: 8/6/2015 with prescribing provider:  3/15/2019   Future Office Visit:    ACT Total Scores 11/27/2017 5/7/2018 3/15/2019   ACT TOTAL SCORE - - -   ASTHMA ER VISITS - - -   ASTHMA HOSPITALIZATIONS - - -   ACT TOTAL SCORE (Goal Greater than or Equal to 20) 23 20 21   In the past 12 months, how many times did you visit the emergency room for your asthma without being admitted to the hospital? 1 0 0   In the past 12 months, how many times were you hospitalized overnight because of your asthma? 1 0 0     Requested Prescriptions   Pending Prescriptions Disp Refills     FLOVENT  MCG/ACT inhaler [Pharmacy Med Name: FLOVENT  MCG INHALER 110 AERO] 36 g 3     Sig: INHALE 2 PUFFS INTO THE LUNGS 2 TIMES DAILY       Inhaled Steroids Protocol Passed - 4/4/2019  5:50 PM        Passed - Patient is age 12 or older        Passed - Asthma control assessment score within normal limits in last 6 months     Please review ACT score.           Passed - Medication is active on med list        Passed - Recent (6 mo) or future (30 days) visit within the authorizing provider's specialty     Patient had office visit in the last 6 months or has a visit in the next 30 days with authorizing provider or within the authorizing provider's specialty.  See \"Patient Info\" tab in inbasket, or \"Choose Columns\" in Meds & Orders section of the refill encounter.              Phoebe Rausch RN on 4/8/2019 at 1:49 PM    "

## 2019-05-03 ENCOUNTER — OFFICE VISIT (OUTPATIENT)
Dept: INTERNAL MEDICINE | Facility: CLINIC | Age: 54
End: 2019-05-03
Payer: COMMERCIAL

## 2019-05-03 VITALS
OXYGEN SATURATION: 98 % | SYSTOLIC BLOOD PRESSURE: 136 MMHG | RESPIRATION RATE: 16 BRPM | HEART RATE: 100 BPM | WEIGHT: 163 LBS | DIASTOLIC BLOOD PRESSURE: 84 MMHG | TEMPERATURE: 97.9 F | BODY MASS INDEX: 28.87 KG/M2

## 2019-05-03 DIAGNOSIS — M79.641 BILATERAL HAND PAIN: ICD-10-CM

## 2019-05-03 DIAGNOSIS — R76.8 POSITIVE ANA (ANTINUCLEAR ANTIBODY): ICD-10-CM

## 2019-05-03 DIAGNOSIS — M79.642 BILATERAL HAND PAIN: ICD-10-CM

## 2019-05-03 DIAGNOSIS — Z82.61 FAMILY HISTORY OF RHEUMATOID ARTHRITIS: ICD-10-CM

## 2019-05-03 DIAGNOSIS — R20.0 BILATERAL LEG NUMBNESS: ICD-10-CM

## 2019-05-03 DIAGNOSIS — F43.23 ADJUSTMENT DISORDER WITH MIXED ANXIETY AND DEPRESSED MOOD: Primary | ICD-10-CM

## 2019-05-03 DIAGNOSIS — Z82.61 FAMILY HISTORY OF JUVENILE RHEUMATOID ARTHRITIS: ICD-10-CM

## 2019-05-03 PROCEDURE — 99214 OFFICE O/P EST MOD 30 MIN: CPT | Performed by: INTERNAL MEDICINE

## 2019-05-03 RX ORDER — CITALOPRAM HYDROBROMIDE 10 MG/1
10 TABLET ORAL DAILY
Qty: 30 TABLET | Refills: 3 | Status: SHIPPED | OUTPATIENT
Start: 2019-05-03 | End: 2019-09-23

## 2019-05-03 ASSESSMENT — PAIN SCALES - GENERAL: PAINLEVEL: NO PAIN (0)

## 2019-05-03 ASSESSMENT — PATIENT HEALTH QUESTIONNAIRE - PHQ9: SUM OF ALL RESPONSES TO PHQ QUESTIONS 1-9: 9

## 2019-05-03 NOTE — PROGRESS NOTES
SUBJECTIVE:   Zina Harrell is a 53 year old female who presents to clinic today for the following   health issues:    Chief Complaint   Patient presents with     Recheck Medication     discuss prozac, having some symtoms       Was going to chiropracter but wasn't helping her much, her other chiropracter doesn't take insurance. Her feet are tingling and numb when walking. Throws her balance off some.  Been going on for some time, long time feet have been numb.     Taking prozac and felt a little shaky has been on them for a month. Maybe less crying. Still having lots of stress.      PHQ 9 is about the same as last month.      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 colp 8/2015 WNL     Current Outpatient Medications   Medication     cetirizine (ZYRTEC) 10 MG tablet     FLOVENT  MCG/ACT inhaler     FLUoxetine (PROZAC) 20 MG capsule     multivitamin, therapeutic with minerals (MULTI-VITAMIN) TABS tablet     omega-3 acid ethyl esters (LOVAZA) 1 g capsule     VENTOLIN  (90 Base) MCG/ACT Inhaler     No current facility-administered medications for this visit.      Review of Systems  Constitutional-No fevers, chills, or weight changes..  Cardiac-No chest pain or palpitations.  Respiratory-No cough, sob, or hemoptysis.  GI-No nausea, vomitting, diarrhea, constipation, or blood in the stool.  Neuro-Weakness, Tingling and numbness.        Physical Exam  /84   Pulse 100   Temp 97.9  F (36.6  C) (Temporal)   Resp 16   Wt 73.9 kg (163 lb)   LMP 03/14/2016 (Within Months)   SpO2 98%   BMI 28.87 kg/m    General Appearance-healthy, alert, no distress  Cardiac-regular rate and rhythm  with normal S1, S2 ; no murmur, rub or gallops  Lungs-clear to auscultation  Extremities-no peripheral edema, peripheral pulses normal  Musculoskeletal-hands have slight swelling in the 4th and 5th digit.    Neuro-reflexes are 2+, negative straight leg raise, strength is 5/5     ASSESSMENT:  This is a 53-year-old woman who comes in for follow-up of her depression.  She has a lot of stress at home with her father living with her who has dementia, she also does not have a job currently she lives in her stepfather's house who is different than her father who has dementia.  She is trying to get him to all of his visits.  Her PHQ 9 score is still 9 but she is may be slightly better she states.  The Prozac was making her feel tremors.  We will switch over to Celexa to try something different.  She can be seen back in 4 to 6 weeks.    Numbness in her legs.  She describes numbness in both legs her exam is reassuring if she has negative straight leg raise she has normal reflexes she has normal strength.  She did have previous low back issues and with his numbness or radicular symptoms we will try to get an MRI of her.    She also has had chronic issues with her hands especially in the fourth and fifth digits she did have a borderline positive SALBADOR and some inflammatory changes on her hand x-ray therefore we will refer her to rheumatology    Electronically signed by Lebron Childers MD

## 2019-05-14 ENCOUNTER — HOSPITAL ENCOUNTER (OUTPATIENT)
Dept: MRI IMAGING | Facility: CLINIC | Age: 54
Discharge: HOME | End: 2019-05-14
Attending: INTERNAL MEDICINE | Admitting: INTERNAL MEDICINE
Payer: COMMERCIAL

## 2019-05-14 DIAGNOSIS — R20.0 BILATERAL LEG NUMBNESS: ICD-10-CM

## 2019-05-14 PROCEDURE — 72148 MRI LUMBAR SPINE W/O DYE: CPT

## 2019-05-15 ENCOUNTER — TELEPHONE (OUTPATIENT)
Dept: INTERNAL MEDICINE | Facility: CLINIC | Age: 54
End: 2019-05-15

## 2019-05-15 DIAGNOSIS — M48.00 SPINAL STENOSIS: Primary | ICD-10-CM

## 2019-05-15 NOTE — TELEPHONE ENCOUNTER
Pt was advised of the results and recommendations, pt verbalized understanding. Pt was transferred to speciality for Neurosurgery consults.

## 2019-05-15 NOTE — TELEPHONE ENCOUNTER
----- Message from Lebron Childers MD sent at 5/15/2019  3:45 PM CDT -----  MRI shows severe central stenosis, she should see neurosurgery about options.

## 2019-05-17 ENCOUNTER — OFFICE VISIT (OUTPATIENT)
Dept: NEUROSURGERY | Facility: CLINIC | Age: 54
End: 2019-05-17
Attending: INTERNAL MEDICINE
Payer: COMMERCIAL

## 2019-05-17 DIAGNOSIS — M54.16 LUMBAR RADICULOPATHY: Primary | ICD-10-CM

## 2019-05-17 DIAGNOSIS — M43.10 ACQUIRED SPONDYLOLISTHESIS: ICD-10-CM

## 2019-05-17 PROCEDURE — 99203 OFFICE O/P NEW LOW 30 MIN: CPT | Performed by: PHYSICIAN ASSISTANT

## 2019-05-17 RX ORDER — TIZANIDINE 2 MG/1
2-4 TABLET ORAL 3 TIMES DAILY
Qty: 50 TABLET | Refills: 1 | Status: SHIPPED | OUTPATIENT
Start: 2019-05-17 | End: 2019-06-20

## 2019-05-17 NOTE — LETTER
5/17/2019         RE: Zina Harrell  [address] [facility] [address]        Dear Colleague,    Thank you for referring your patient, Zina Harrell, to the [facility]. Please see a copy of my visit note below.    Dr. Stuart Warner  [facility]  Neurosurgery Clinic Visit      CC: back and leg pain    Primary care Provider: Lebron Childers    Referring Provider:  Lebron Childers MD      Reason For Visit:   I was asked to consult on the patient for leg and back pain.      HPI: Zina Harrell is a 53 year old female who presents for evaluation of her chief complaint of low back and bilateral leg pain.  She describes aching low back pain, with pain that radiates into the lateral aspect of her thighs and calves bilaterally.  She notices numbness and tingling in her feet bilaterally.  Symptoms have been present for several years, with no event or injury.  She has recently been doing chiropractic care, with minimal symptomatic improvement.  She takes ibuprofen almost daily.  She denies any bowel or bladder changes, or any problems with balance or coordination.    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 colp 8/2015 WNL       Past Medical History reviewed with patient during visit.    Past Surgical History:   Procedure Laterality Date     APPENDECTOMY  1982     COLONOSCOPY N/A 7/2/2018    Procedure: COLONOSCOPY;  COLONOSCOPY;  Surgeon: Sundeep Boston MD;  Location:  GI     Past Surgical History reviewed with patient during visit.    Current Outpatient Medications   Medication     tiZANidine (ZANAFLEX) 2 MG tablet     cetirizine (ZYRTEC) 10 MG tablet     citalopram (CELEXA) 10 MG tablet     FLOVENT  MCG/ACT inhaler     FLUoxetine (PROZAC) 20 MG capsule     multivitamin, therapeutic with minerals (MULTI-VITAMIN) TABS tablet     omega-3 acid  ethyl esters (LOVAZA) 1 g capsule     VENTOLIN  (90 Base) MCG/ACT Inhaler     No current facility-administered medications for this visit.        Allergies   Allergen Reactions     Penicillin V      Childhood reaction unk       Social History     Socioeconomic History     Marital status:      Spouse name: Not on file     Number of children: Not on file     Years of education: Not on file     Highest education level: Not on file   Occupational History     Not on file   Social Needs     Financial resource strain: Not on file     Food insecurity:     Worry: Not on file     Inability: Not on file     Transportation needs:     Medical: Not on file     Non-medical: Not on file   Tobacco Use     Smoking status: Current Every Day Smoker     Packs/day: 0.50     Smokeless tobacco: Never Used     Tobacco comment: smoked since age 20   Substance and Sexual Activity     Alcohol use: Yes     Comment: rarely     Drug use: No     Sexual activity: Never   Lifestyle     Physical activity:     Days per week: Not on file     Minutes per session: Not on file     Stress: Not on file   Relationships     Social connections:     Talks on phone: Not on file     Gets together: Not on file     Attends Yazidi service: Not on file     Active member of club or organization: Not on file     Attends meetings of clubs or organizations: Not on file     Relationship status: Not on file     Intimate partner violence:     Fear of current or ex partner: Not on file     Emotionally abused: Not on file     Physically abused: Not on file     Forced sexual activity: Not on file   Other Topics Concern     Parent/sibling w/ CABG, MI or angioplasty before 65F 55M? Not Asked   Social History Narrative     Not on file       Family History   Problem Relation Age of Onset     Arthritis Mother         RA     Hypertension Mother      Other Cancer Mother         skin cancer     Cerebrovascular Disease Maternal Grandfather      Diabetes Sister          gestational     Coronary Artery Disease No family hx of      Breast Cancer No family hx of      Colon Cancer No family hx of      Genetic Disorder No family hx of      Thyroid Disease No family hx of           ROS: 10 point ROS neg other than the symptoms noted above in the HPI.    Vital Signs: LMP 03/14/2016 (Within Months)     Examination:  Constitutional:  Alert, well nourished, NAD.  HEENT: Normocephalic, atraumatic.   Pulmonary:  Without shortness of breath, normal effort.   Lymph: no lymphadenopathy to low back or LE.   Integumentary: Skin is free of rashes or lesions.   Cardiovascular:  No pitting edema of BLE.    Psych: Normal affect, no apparent distress    Neurological:  Awake  Alert  Oriented x 3  Speech clear  Cranial nerves II - XII grossly intact  Motor exam  Hip Flexor:                Right: 5/5  Left:  5/5  Hip Adductor:             Right:  5/5  Left:  5/5  Hip Abductor:             Right:  5/5  Left:  5/5  Gastroc Soleus:        Right:  5/5  Left:  5/5  Tib/Ant:                      Right:  5/5  Left:  5/5  EHL:                          Right:  5/5  Left:  5/5       Sensation normal to bilateral upper and lower extremities.    Reflexes are 2+ in the patellar and Achilles. There is no clonus. Downgoing Babinski.  Musculoskeletal:  Gait: Able to stand from a seated position. Normal non-antalgic, non-myelopathic gait.  Lumbar examination reveals no tenderness of the spine or paraspinous muscles.  Hip height is symmetrical. Negative SI joint, sciatic notch or greater trochanteric tenderness to palpation bilaterally.  Straight leg raise is negative bilaterally.      Imaging:   MRI of the lumbar spine was reviewed in the office today. There is a grade II spondylolisthesis at L5-S1 with associated severe foraminal stenosis bialterally.     Assessment/Plan:     Low back pain  Grade II spondylolisthesis at L5-S1  Bilateral foraminal stenosis CAREN-S1    Zina Harrell is a 53 year old female.  I did have a  discussion with the patient regarding her symptoms.  She understands the findings described in her MRI above.  She does have a grade 2 spondylolisthesis with associated bilateral foraminal stenosis, which is severe.  She has been working with [facility], which has been mildly helpful.  We will get her started with physical therapy.  I also ordered bilateral transforaminal epidural injections at L5-S1.  If they are not able to do this because of the degree of stenosis, then she could just get a translaminar epidural.  I encouraged her to follow-up with Dr. Warner if the injections are not helpful.  She voiced agreement and understanding.          Dieudonne Greenberg PA-C  [facility]  [place] [address]    Tel [phone]  Pager [phone]      Again, thank you for allowing me to participate in the care of your patient.        Sincerely,        Dieudonne Greenberg PA-C

## 2019-05-17 NOTE — PROGRESS NOTES
Dr. Stuart Warner  [facility]  Neurosurgery Clinic Visit      CC: back and leg pain    Primary care Provider: Lebron Childers    Referring Provider:  Lebron Childers MD      Reason For Visit:   I was asked to consult on the patient for leg and back pain.      HPI: Zina Harrell is a 53 year old female who presents for evaluation of her chief complaint of low back and bilateral leg pain.  She describes aching low back pain, with pain that radiates into the lateral aspect of her thighs and calves bilaterally.  She notices numbness and tingling in her feet bilaterally.  Symptoms have been present for several years, with no event or injury.  She has recently been doing chiropractic care, with minimal symptomatic improvement.  She takes ibuprofen almost daily.  She denies any bowel or bladder changes, or any problems with balance or coordination.    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 colp 8/2015 WNL       Past Medical History reviewed with patient during visit.    Past Surgical History:   Procedure Laterality Date     APPENDECTOMY  1982     COLONOSCOPY N/A 7/2/2018    Procedure: COLONOSCOPY;  COLONOSCOPY;  Surgeon: Sundeep Boston MD;  Location:  GI     Past Surgical History reviewed with patient during visit.    Current Outpatient Medications   Medication     tiZANidine (ZANAFLEX) 2 MG tablet     cetirizine (ZYRTEC) 10 MG tablet     citalopram (CELEXA) 10 MG tablet     FLOVENT  MCG/ACT inhaler     FLUoxetine (PROZAC) 20 MG capsule     multivitamin, therapeutic with minerals (MULTI-VITAMIN) TABS tablet     omega-3 acid ethyl esters (LOVAZA) 1 g capsule     VENTOLIN  (90 Base) MCG/ACT Inhaler     No current facility-administered medications for this visit.        Allergies   Allergen Reactions     Penicillin V      Childhood reaction unk       Social History      Socioeconomic History     Marital status:      Spouse name: Not on file     Number of children: Not on file     Years of education: Not on file     Highest education level: Not on file   Occupational History     Not on file   Social Needs     Financial resource strain: Not on file     Food insecurity:     Worry: Not on file     Inability: Not on file     Transportation needs:     Medical: Not on file     Non-medical: Not on file   Tobacco Use     Smoking status: Current Every Day Smoker     Packs/day: 0.50     Smokeless tobacco: Never Used     Tobacco comment: smoked since age 20   Substance and Sexual Activity     Alcohol use: Yes     Comment: rarely     Drug use: No     Sexual activity: Never   Lifestyle     Physical activity:     Days per week: Not on file     Minutes per session: Not on file     Stress: Not on file   Relationships     Social connections:     Talks on phone: Not on file     Gets together: Not on file     Attends Hoahaoism service: Not on file     Active member of club or organization: Not on file     Attends meetings of clubs or organizations: Not on file     Relationship status: Not on file     Intimate partner violence:     Fear of current or ex partner: Not on file     Emotionally abused: Not on file     Physically abused: Not on file     Forced sexual activity: Not on file   Other Topics Concern     Parent/sibling w/ CABG, MI or angioplasty before 65F 55M? Not Asked   Social History Narrative     Not on file       Family History   Problem Relation Age of Onset     Arthritis Mother         RA     Hypertension Mother      Other Cancer Mother         skin cancer     Cerebrovascular Disease Maternal Grandfather      Diabetes Sister         gestational     Coronary Artery Disease No family hx of      Breast Cancer No family hx of      Colon Cancer No family hx of      Genetic Disorder No family hx of      Thyroid Disease No family hx of           ROS: 10 point ROS neg other than the  symptoms noted above in the HPI.    Vital Signs: LMP 03/14/2016 (Within Months)     Examination:  Constitutional:  Alert, well nourished, NAD.  HEENT: Normocephalic, atraumatic.   Pulmonary:  Without shortness of breath, normal effort.   Lymph: no lymphadenopathy to low back or LE.   Integumentary: Skin is free of rashes or lesions.   Cardiovascular:  No pitting edema of BLE.    Psych: Normal affect, no apparent distress    Neurological:  Awake  Alert  Oriented x 3  Speech clear  Cranial nerves II - XII grossly intact  Motor exam  Hip Flexor:                Right: 5/5  Left:  5/5  Hip Adductor:             Right:  5/5  Left:  5/5  Hip Abductor:             Right:  5/5  Left:  5/5  Gastroc Soleus:        Right:  5/5  Left:  5/5  Tib/Ant:                      Right:  5/5  Left:  5/5  EHL:                          Right:  5/5  Left:  5/5       Sensation normal to bilateral upper and lower extremities.    Reflexes are 2+ in the patellar and Achilles. There is no clonus. Downgoing Babinski.  Musculoskeletal:  Gait: Able to stand from a seated position. Normal non-antalgic, non-myelopathic gait.  Lumbar examination reveals no tenderness of the spine or paraspinous muscles.  Hip height is symmetrical. Negative SI joint, sciatic notch or greater trochanteric tenderness to palpation bilaterally.  Straight leg raise is negative bilaterally.      Imaging:   MRI of the lumbar spine was reviewed in the office today. There is a grade II spondylolisthesis at L5-S1 with associated severe foraminal stenosis bialterally.     Assessment/Plan:     Low back pain  Grade II spondylolisthesis at L5-S1  Bilateral foraminal stenosis L5-S1    Zina Harrell is a 53 year old female.  I did have a discussion with the patient regarding her symptoms.  She understands the findings described in her MRI above.  She does have a grade 2 spondylolisthesis with associated bilateral foraminal stenosis, which is severe.  She has been working with  [facility], which has been mildly helpful.  We will get her started with physical therapy.  I also ordered bilateral transforaminal epidural injections at L5-S1.  If they are not able to do this because of the degree of stenosis, then she could just get a translaminar epidural.  I encouraged her to follow-up with Dr. Warner if the injections are not helpful.  She voiced agreement and understanding.          Dieudonne Greenberg PA-C  [facility]  [address] [address]    Tel [phone]  Pager [phone]

## 2019-05-23 ENCOUNTER — TELEPHONE (OUTPATIENT)
Dept: SURGERY | Facility: CLINIC | Age: 54
End: 2019-05-23

## 2019-05-23 NOTE — TELEPHONE ENCOUNTER
patient scheduled TESI  Date: 6/13/19  Time: 230  Dr. Walker    Instructed pt to have H&P and  for procedure.

## 2019-06-04 DIAGNOSIS — J45.20 INTERMITTENT ASTHMA, WELL CONTROLLED: ICD-10-CM

## 2019-06-04 DIAGNOSIS — R06.02 SOB (SHORTNESS OF BREATH): ICD-10-CM

## 2019-06-06 RX ORDER — ALBUTEROL SULFATE 90 UG/1
AEROSOL, METERED RESPIRATORY (INHALATION)
Qty: 54 G | Refills: 2 | Status: SHIPPED | OUTPATIENT
Start: 2019-06-06 | End: 2020-04-28

## 2019-06-06 NOTE — TELEPHONE ENCOUNTER
"Requested Prescriptions   Pending Prescriptions Disp Refills     VENTOLIN  (90 Base) MCG/ACT inhaler [Pharmacy Med Name: VENTOLIN HFA 90 MCG INHALER 108 (90 BAS AERO] 54 g 2     Sig: INHALE 2 PUFFS INTO THE LUNGS EVERY 4 HOURS AS NEEDED FOR SHORTNESS OF BREATH/DYSPNEA OR WHEEZING   Last Written Prescription Date:  7/30/2018  Last Fill Quantity: 54 g,  # refills: 2 refills   Last office visit: 5/3/2019 with prescribing provider:     Future Office Visit:   Next 5 appointments (look out 90 days)    Jun 12, 2019 10:30 AM CDT  Pre-Op physical with Lebron Childers MD  [facility] [address] [place] [id number]  [phone]   Jun 20, 2019  1:30 PM CDT  Return Visit with Stuart Warner MD  [facility] ([facility]) [address] [place] [address]  [phone]             Asthma Maintenance Inhalers - Anticholinergics Passed - 6/4/2019  7:28 PM        Passed - Patient is age 12 years or older        Passed - Asthma control assessment score within normal limits in last 6 months     Please review ACT score.   ACT Total Scores 11/27/2017 5/7/2018 3/15/2019   ACT TOTAL SCORE - - -   ASTHMA ER VISITS - - -   ASTHMA HOSPITALIZATIONS - - -   ACT TOTAL SCORE (Goal Greater than or Equal to 20) 23 20 21   In the past 12 months, how many times did you visit the emergency room for your asthma without being admitted to the hospital? 1 0 0   In the past 12 months, how many times were you hospitalized overnight because of your asthma? 1 0 0             Passed - Medication is active on med list        Passed - Recent (6 mo) or future (30 days) visit within the authorizing provider's specialty     Patient had office visit in the last 6 months or has a visit in the next 30 days with authorizing provider or within the authorizing provider's specialty.  See \"Patient Info\" tab in inbasket, or \"Choose Columns\" in Meds & Orders section " of the refill encounter.          Prescription approved per [facility] Refill Protocol.  Kathleen Sebastian RN

## 2019-06-12 ENCOUNTER — OFFICE VISIT (OUTPATIENT)
Dept: INTERNAL MEDICINE | Facility: CLINIC | Age: 54
End: 2019-06-12
Payer: COMMERCIAL

## 2019-06-12 ENCOUNTER — ANESTHESIA EVENT (OUTPATIENT)
Dept: SURGERY | Facility: CLINIC | Age: 54
End: 2019-06-12
Payer: COMMERCIAL

## 2019-06-12 VITALS
RESPIRATION RATE: 16 BRPM | BODY MASS INDEX: 29.58 KG/M2 | OXYGEN SATURATION: 100 % | DIASTOLIC BLOOD PRESSURE: 70 MMHG | TEMPERATURE: 98 F | SYSTOLIC BLOOD PRESSURE: 136 MMHG | HEART RATE: 90 BPM | WEIGHT: 167 LBS

## 2019-06-12 DIAGNOSIS — Z01.818 PREOP GENERAL PHYSICAL EXAM: ICD-10-CM

## 2019-06-12 DIAGNOSIS — R20.0 BILATERAL LEG NUMBNESS: ICD-10-CM

## 2019-06-12 DIAGNOSIS — F43.23 ADJUSTMENT DISORDER WITH MIXED ANXIETY AND DEPRESSED MOOD: ICD-10-CM

## 2019-06-12 DIAGNOSIS — M48.061 SPINAL STENOSIS OF LUMBAR REGION WITHOUT NEUROGENIC CLAUDICATION: Primary | ICD-10-CM

## 2019-06-12 PROCEDURE — 99214 OFFICE O/P EST MOD 30 MIN: CPT | Performed by: INTERNAL MEDICINE

## 2019-06-12 ASSESSMENT — PAIN SCALES - GENERAL: PAINLEVEL: MILD PAIN (3)

## 2019-06-12 NOTE — PROGRESS NOTES
[address] [address]  [phone]  Dept: [phone]    PRE-OP EVALUATION:  Today's date: 2019    Zina Harrell (: 1965) presents for pre-operative evaluation assessment as requested by Dr. Walker.  She requires evaluation and anesthesia risk assessment prior to undergoing surgery/procedure for treatment of lumbar radiculopathy .    Fax number for surgical facility:   Primary Physician: Lebron Childers  Type of Anesthesia Anticipated: to be determined    Patient has a Health Care Directive or Living Will:  NO    Preop Questions 2019   Who is doing your surgery? unknown   What are you having done? cortizone shot   Date of Surgery/Procedure: 2019   Facility or Hospital where procedure/surgery will be performed: [facility]   1.  Do you have a history of Heart attack, stroke, stent, coronary bypass surgery, or other heart surgery? No   2.  Do you ever have any pain or discomfort in your chest? No   3.  Do you have a history of  Heart Failure? No   4.   Are you troubled by shortness of breath when:  walking on a level surface, or up a slight hill, or at night? No   5.  Do you currently have a cold, bronchitis or other respiratory infection? No   6.  Do you have a cough, shortness of breath, or wheezing? No   7.  Do you sometimes get pains in the calves of your legs when you walk? No   8. Do you or anyone in your family have previous history of blood clots? YES -   9.  Do you or does anyone in your family have a serious bleeding problem such as prolonged bleeding following surgeries or cuts? No   10. Have you ever had problems with anemia or been told to take iron pills? No   11. Have you had any abnormal blood loss such as black, tarry or bloody stools, or abnormal vaginal bleeding? No   12. Have you ever had a blood transfusion? YES -   13. Have you or any of your relatives ever had problems with anesthesia? No   14. Do you  have sleep apnea, excessive snoring or daytime drowsiness? No   15. Do you have any prosthetic heart valves? No   16. Do you have prosthetic joints? No   17. Is there any chance that you may be pregnant? No       HPI:     HPI related to upcoming procedure:low back pain and has had issues, getting an injection tomorrow for the pain.        DEPRESSION - Patient has a long history of Depression of moderate severity requiring medication for control with recent symptoms being stable..Current symptoms of depression include none.       MEDICAL HISTORY:     Patient Active Problem List    Diagnosis Date Noted     Adjustment disorder with mixed anxiety and depressed mood 09/14/2016     Priority: Medium     Cervical high risk HPV (human papillomavirus) test positive 06/05/2015     Priority: Medium     6/5/15 normal pap/+ HR HPV (not 16 or 18). Plan: repeat pap and HPV testing in 1 year.   6/25/16 Dr. Kaye, recommended that patient have colposcopy.  8/6/15 [place] bx: negative. ECC: negative. Pap: NIL/neg HPV. Plan: cotest in 1 yr.  9/14/16 NIL pap/neg HR HPV. Plan: Repeat pap and HPV in 1 year.  11/27/17 NIL pap/neg HR HPV. Plan: cotest due 3 yr         CARDIOVASCULAR SCREENING; LDL GOAL LESS THAN 160 06/04/2012     Priority: Medium     Tobacco abuse 06/04/2012     Priority: Medium     Asthma, intermittent controlled      Priority: Medium     age 16 hospitalized  Problem list name updated by automated process. Provider to review and confirm        Past Medical History:   Diagnosis Date     Asthma, intermittent controlled age 8    age 16 hospitalized     High risk HPV infection 6/5/15    normal pap. colp 8/2015 WNL     Past Surgical History:   Procedure Laterality Date     APPENDECTOMY  1982     COLONOSCOPY N/A 7/2/2018    Procedure: COLONOSCOPY;  COLONOSCOPY;  Surgeon: Sundeep Boston MD;  Location:  GI     Current Outpatient Medications   Medication Sig Dispense Refill     cetirizine (ZYRTEC) 10 MG tablet TAKE ONE  TABLET BY MOUTH EACH EVENING 30 tablet 5     citalopram (CELEXA) 10 MG tablet Take 1 tablet (10 mg) by mouth daily 30 tablet 3     FLOVENT  MCG/ACT inhaler INHALE 2 PUFFS INTO THE LUNGS 2 TIMES DAILY 36 g 2     multivitamin, therapeutic with minerals (MULTI-VITAMIN) TABS tablet Take 1 tablet by mouth daily       omega-3 acid ethyl esters (LOVAZA) 1 g capsule Take 2 g by mouth 2 times daily       tiZANidine (ZANAFLEX) 2 MG tablet Take 1-2 tablets (2-4 mg) by mouth 3 times daily 50 tablet 1     VENTOLIN  (90 Base) MCG/ACT inhaler INHALE 2 PUFFS INTO THE LUNGS EVERY 4 HOURS AS NEEDED FOR SHORTNESS OF BREATH/DYSPNEA OR WHEEZING 54 g 2     OTC products: None, except as noted above    Allergies   Allergen Reactions     Penicillin V      Childhood reaction unk      Latex Allergy: NO    Social History     Tobacco Use     Smoking status: Current Every Day Smoker     Packs/day: 0.50     Smokeless tobacco: Never Used     Tobacco comment: smoked since age 20   Substance Use Topics     Alcohol use: Yes     Comment: rarely     History   Drug Use No     REVIEW OF SYSTEMS:   Constitutional, neuro, ENT, endocrine, pulmonary, cardiac, gastrointestinal, genitourinary, musculoskeletal, integument and psychiatric systems are negative, except as otherwise noted.  Smoking some with stress at home-her dad is still staying with her.    EXAM:   /70   Pulse 90   Temp 98  F (36.7  C) (Temporal)   Resp 16   Wt 75.8 kg (167 lb)   LMP 03/14/2016 (Within Months)   SpO2 100%   BMI 29.58 kg/m      GENERAL APPEARANCE: healthy, alert and no distress     HENT: ear canals and TM's normal and nose and mouth without ulcers or lesions     NECK: no adenopathy, no asymmetry, masses, or scars and thyroid normal to palpation     RESP: lungs clear to auscultation - no rales, rhonchi or wheezes     CV: regular rates and rhythm, normal S1 S2, no S3 or S4 and no murmur, click or rub     ABDOMEN:  soft, nontender, no HSM or masses and  bowel sounds normal     MS: extremities normal- no gross deformities noted, no evidence of inflammation in joints, FROM in all extremities.     SKIN: no suspicious lesions or rashes     PSYCH: mentation appears normal. and affect normal/bright    DIAGNOSTICS:   No labs or EKG required for low risk surgery (cataract, skin procedure, breast biopsy, etc)    Recent Labs   Lab Test 03/15/19  1611 11/27/17  1249   HGB 13.4  --      --     141   POTASSIUM 4.2 4.0   CR 0.88 0.78     IMPRESSION:   Reason for surgery/procedure: low back pains    The proposed surgical procedure is considered LOW risk.    REVISED CARDIAC RISK INDEX  The patient has the following serious cardiovascular risks for perioperative complications such as (MI, PE, VFib and 3  AV Block):  No serious cardiac risks  INTERPRETATION: 1 risks: Class II (low risk - 0.9% complication rate)    The patient has the following additional risks for perioperative complications:  No identified additional risks    No diagnosis found.    RECOMMENDATIONS:         --Patient is to take all scheduled medications on the day of surgery EXCEPT for modifications listed below.    APPROVAL GIVEN to proceed with proposed procedure, without further diagnostic evaluation       Signed Electronically by: Lebron Childers MD    Copy of this evaluation report is provided to requesting physician.    [place] Preop Guidelines    Revised Cardiac Risk Index

## 2019-06-13 ENCOUNTER — ANESTHESIA (OUTPATIENT)
Dept: SURGERY | Facility: CLINIC | Age: 54
End: 2019-06-13
Payer: COMMERCIAL

## 2019-06-13 ENCOUNTER — HOSPITAL ENCOUNTER (OUTPATIENT)
Facility: CLINIC | Age: 54
Discharge: HOME | End: 2019-06-13
Attending: ANESTHESIOLOGY | Admitting: ANESTHESIOLOGY
Payer: COMMERCIAL

## 2019-06-13 ENCOUNTER — HOSPITAL ENCOUNTER (OUTPATIENT)
Dept: GENERAL RADIOLOGY | Facility: CLINIC | Age: 54
End: 2019-06-13
Attending: ANESTHESIOLOGY | Admitting: ANESTHESIOLOGY
Payer: COMMERCIAL

## 2019-06-13 VITALS
DIASTOLIC BLOOD PRESSURE: 60 MMHG | RESPIRATION RATE: 16 BRPM | TEMPERATURE: 98.7 F | SYSTOLIC BLOOD PRESSURE: 103 MMHG | OXYGEN SATURATION: 100 %

## 2019-06-13 DIAGNOSIS — M54.16 LUMBAR RADICULOPATHY: ICD-10-CM

## 2019-06-13 PROCEDURE — 40000277 XR SURGERY CARM FLUORO LESS THAN 5 MIN W STILLS: Mod: TC

## 2019-06-13 PROCEDURE — 25000125 ZZHC RX 250: Performed by: NURSE ANESTHETIST, CERTIFIED REGISTERED

## 2019-06-13 PROCEDURE — 25000128 H RX IP 250 OP 636: Performed by: ANESTHESIOLOGY

## 2019-06-13 PROCEDURE — 37000009 ZZH ANESTHESIA TECHNICAL FEE, EACH ADDTL 15 MIN: Performed by: ANESTHESIOLOGY

## 2019-06-13 PROCEDURE — 37000008 ZZH ANESTHESIA TECHNICAL FEE, 1ST 30 MIN: Performed by: ANESTHESIOLOGY

## 2019-06-13 PROCEDURE — 25000128 H RX IP 250 OP 636: Performed by: NURSE ANESTHETIST, CERTIFIED REGISTERED

## 2019-06-13 PROCEDURE — 64483 NJX AA&/STRD TFRM EPI L/S 1: CPT | Mod: 50 | Performed by: ANESTHESIOLOGY

## 2019-06-13 RX ORDER — ONDANSETRON 4 MG/1
4 TABLET, ORALLY DISINTEGRATING ORAL EVERY 30 MIN PRN
Status: CANCELLED | OUTPATIENT
Start: 2019-06-13

## 2019-06-13 RX ORDER — TRIAMCINOLONE ACETONIDE 40 MG/ML
INJECTION, SUSPENSION INTRA-ARTICULAR; INTRAMUSCULAR PRN
Status: DISCONTINUED | OUTPATIENT
Start: 2019-06-13 | End: 2019-06-13 | Stop reason: HOSPADM

## 2019-06-13 RX ORDER — LIDOCAINE 40 MG/G
CREAM TOPICAL
Status: DISCONTINUED | OUTPATIENT
Start: 2019-06-13 | End: 2019-06-13 | Stop reason: HOSPADM

## 2019-06-13 RX ORDER — LIDOCAINE HYDROCHLORIDE 20 MG/ML
INJECTION, SOLUTION INFILTRATION; PERINEURAL PRN
Status: DISCONTINUED | OUTPATIENT
Start: 2019-06-13 | End: 2019-06-13

## 2019-06-13 RX ORDER — SODIUM CHLORIDE, SODIUM LACTATE, POTASSIUM CHLORIDE, CALCIUM CHLORIDE 600; 310; 30; 20 MG/100ML; MG/100ML; MG/100ML; MG/100ML
INJECTION, SOLUTION INTRAVENOUS CONTINUOUS
Status: CANCELLED | OUTPATIENT
Start: 2019-06-13

## 2019-06-13 RX ORDER — ONDANSETRON 2 MG/ML
4 INJECTION INTRAMUSCULAR; INTRAVENOUS EVERY 30 MIN PRN
Status: CANCELLED | OUTPATIENT
Start: 2019-06-13

## 2019-06-13 RX ORDER — PROPOFOL 10 MG/ML
INJECTION, EMULSION INTRAVENOUS PRN
Status: DISCONTINUED | OUTPATIENT
Start: 2019-06-13 | End: 2019-06-13

## 2019-06-13 RX ORDER — IOPAMIDOL 612 MG/ML
INJECTION, SOLUTION INTRATHECAL PRN
Status: DISCONTINUED | OUTPATIENT
Start: 2019-06-13 | End: 2019-06-13 | Stop reason: HOSPADM

## 2019-06-13 RX ADMIN — LIDOCAINE HYDROCHLORIDE 1 ML: 10 INJECTION, SOLUTION EPIDURAL; INFILTRATION; INTRACAUDAL; PERINEURAL at 14:07

## 2019-06-13 RX ADMIN — PROPOFOL 50 MG: 10 INJECTION, EMULSION INTRAVENOUS at 14:39

## 2019-06-13 RX ADMIN — LIDOCAINE HYDROCHLORIDE 40 MG: 20 INJECTION, SOLUTION INFILTRATION; PERINEURAL at 14:39

## 2019-06-13 ASSESSMENT — LIFESTYLE VARIABLES: TOBACCO_USE: 1

## 2019-06-13 NOTE — ANESTHESIA POSTPROCEDURE EVALUATION
Patient: Zina Bedford    Procedure(s):  INJECTION, EPIDURAL, TRANSFORAMINAL APPROACH LUMBAR 5- SACRAL 1    Diagnosis:lumbar radiculopathy  Diagnosis Additional Information: No value filed.    Anesthesia Type:  MAC    Note:  Anesthesia Post Evaluation    Patient location during evaluation: Phase 2  Patient participation: Able to fully participate in evaluation  Level of consciousness: awake and alert  Pain management: adequate  Airway patency: patent  Cardiovascular status: acceptable and stable  Respiratory status: acceptable and room air  Hydration status: acceptable  PONV: none     Anesthetic complications: None    Comments:  Patient was happy with the anesthesia care received and no anesthesia related complications were noted. Patient was sitting up, drinking fluid. Denies SOB, cough or any other associated respiratory symptoms.  I will follow up with the patient again if it is needed.        Last vitals:  Vitals:    06/13/19 1349 06/13/19 1504   BP: 137/89 113/78   Resp: 20 16   Temp: 98.7  F (37.1  C)    SpO2:  100%         Electronically Signed By: ANIYAH Ledbetter CRNA  June 13, 2019  3:22 PM

## 2019-06-13 NOTE — DISCHARGE INSTRUCTIONS
Home Care Instructions                Procedure:  Epidural Steroid Injection or Joint injection    Activity:    Rest today    Do not work today    Resume normal activity tomorrow    Pain:    You may experience soreness at the injection site for one or two days    You may use an ice pack for 20 minutes every 2 hours for the first 24 hours    You may use a heating pad after the first 24 hours    You may use Tylenol  (acetaminophen) every 4 hours or other pain medicines as directed by your physician    Safety  Sedation medicine, if given may remain active for many hours.    It is important for the next 24 hours that you do not:    Drive a car    Operate machines or power tools    Consume alcohol, including beer    Sign any important papers or legal documents    You may experience numbness radiating into your legs or arms, (depending on the procedure location)  This numbness may last several hours.  Until the numb sensation returns to normal please use caution in walking, climbing stairs, stepping out of your vehicle, etc.    Common side effects of steroids:  Not everyone will experience corticosteroid side effects. If side effects are experienced they will gradually subside in the 7-10 day period following an injection.    Most common side effects include:    Flushed face and/or chest    Feeling of warmth, particularly in face but could be overall feeling of warmth    Increased blood sugar in diabetic patients    Menstrual irregularities may occur.  If taking hormone based birth control an alternate method of birth control is recommended    Sleep disturbances and/or mood swings are possible    Leg cramps    Please contact us if you have:  Severe pain   Fever more than 101.5 degrees Fahrenheit  Signs of infection (redness, swelling or drainage)      If you have questions during normal business hours (8am-5pm Monday-Friday) contact the [facility] at [phone]. If you need help after hours, we recommend that  you go to a hospital emergency room or dial 911.

## 2019-06-13 NOTE — ANESTHESIA PREPROCEDURE EVALUATION
Anesthesia Pre-Procedure Evaluation    Patient: Zina Harrell   MRN: [id number] : 1965          Preoperative Diagnosis: lumbar radiculopathy    Procedure(s):  INJECTION, EPIDURAL, TRANSFORAMINAL APPROACH LUMBAR 5- SACRAL 1  VERSUS INJECTION, EPIDURAL, CAUDAL    Past Medical History:   Diagnosis Date     Asthma, intermittent controlled age 8    age 16 hospitalized     High risk HPV infection 6/5/15    normal pap. colp 2015 WNL     Past Surgical History:   Procedure Laterality Date     APPENDECTOMY       COLONOSCOPY N/A 2018    Procedure: COLONOSCOPY;  COLONOSCOPY;  Surgeon: Sundeep Boston MD;  Location:  GI       Anesthesia Evaluation     . Pt has had prior anesthetic. Type: General and MAC    No history of anesthetic complications          ROS/MED HX    ENT/Pulmonary:     (+)tobacco use, 0.5 packs/day  Intermittent asthma , . .    Neurologic:  - neg neurologic ROS     Cardiovascular:  - neg cardiovascular ROS   (+) ----. : . . . :. . No previous cardiac testing       METS/Exercise Tolerance:  >4 METS   Hematologic:  - neg hematologic  ROS       Musculoskeletal:  - neg musculoskeletal ROS       GI/Hepatic: Comment: Occasional use of TUMS for mild gastric reflux - neg GI/hepatic ROS   (+) GERD Asymptomatic on medication,       Renal/Genitourinary:  - ROS Renal section negative       Endo:  - neg endo ROS       Psychiatric:     (+) psychiatric history anxiety      Infectious Disease:  - neg infectious disease ROS       Malignancy:      - no malignancy   Other: Comment: Post menopasual   (+) No chance of pregnancy C-spine cleared: N/A, H/O Chronic Pain,no other significant disability   - neg other ROS                      Physical Exam  Normal systems: cardiovascular, pulmonary and dental    Airway   Mallampati: II  TM distance: >3 FB  Neck ROM: full    Dental   Comment: Poor denition    Cardiovascular   Rhythm and rate: regular and normal      Pulmonary    breath sounds clear to  "auscultation            Lab Results   Component Value Date    WBC 5.3 03/15/2019    HGB 13.4 03/15/2019    HCT 40.8 03/15/2019     03/15/2019    CRP <2.9 03/19/2019    SED 7 03/19/2019     03/15/2019    POTASSIUM 4.2 03/15/2019    CHLORIDE 108 03/15/2019    CO2 29 03/15/2019    BUN 10 03/15/2019    CR 0.88 03/15/2019     (H) 03/15/2019    DALE 8.5 03/15/2019    ALBUMIN 3.6 03/15/2019    PROTTOTAL 7.2 03/15/2019    ALT 37 03/15/2019    AST 30 03/15/2019    ALKPHOS 92 03/15/2019    BILITOTAL 0.4 03/15/2019    TSH 0.81 03/15/2019       Preop Vitals  BP Readings from Last 3 Encounters:   06/12/19 136/70   05/17/19 128/78   05/03/19 136/84    Pulse Readings from Last 3 Encounters:   06/12/19 90   05/17/19 84   05/03/19 100      Resp Readings from Last 3 Encounters:   06/12/19 16   05/03/19 16   03/15/19 16    SpO2 Readings from Last 3 Encounters:   06/12/19 100%   05/17/19 99%   05/03/19 98%      Temp Readings from Last 1 Encounters:   06/12/19 98  F (36.7  C) (Temporal)    Ht Readings from Last 1 Encounters:   05/17/19 1.6 m (5' 3\")      Wt Readings from Last 1 Encounters:   06/12/19 75.8 kg (167 lb)    Estimated body mass index is 29.58 kg/m  as calculated from the following:    Height as of 5/17/19: 1.6 m (5' 3\").    Weight as of 6/12/19: 75.8 kg (167 lb).       Anesthesia Plan      History & Physical Review  History and physical reviewed and following examination; no interval change.    ASA Status:  2 .    NPO Status:  > 8 hours (small meal at 0600)    Plan for MAC with Intravenous and Propofol induction. Maintenance will be TIVA.  Reason for MAC:  Deep or markedly invasive procedure (G8)         Postoperative Care      Consents  Anesthetic plan, risks, benefits and alternatives discussed with:  Patient.  Use of blood products discussed: No .   .                 ANIYAH Ledbetter CRNA  "

## 2019-06-13 NOTE — ANESTHESIA CARE TRANSFER NOTE
Patient: Zina [place]    Procedure(s):  INJECTION, EPIDURAL, TRANSFORAMINAL APPROACH LUMBAR 5- SACRAL 1    Diagnosis: lumbar radiculopathy  Diagnosis Additional Information: No value filed.    Anesthesia Type:   MAC     Note:  Airway :Room Air  Patient transferred to:Phase II  Comments: Nicolas 9. Pt awake and appropriately responding to commands. O2 sats 99% on room air. Lungs CTA. No concern for aspiration noted. Will continue to monitor in Phase II. Handoff Report: Identifed the Patient, Identified the Reponsible Provider, Reviewed the pertinent medical history, Discussed the surgical course, Reviewed Intra-OP anesthesia mangement and issues during anesthesia, Set expectations for post-procedure period and Allowed opportunity for questions and acknowledgement of understanding      Vitals: (Last set prior to Anesthesia Care Transfer)    CRNA VITALS  6/13/2019 1428 - 6/13/2019 1510      6/13/2019             Resp Rate (observed):  12    EKG:  Sinus rhythm                Electronically Signed By: ANIYAH Ledbetter CRNA  June 13, 2019  3:10 PM

## 2019-06-13 NOTE — OP NOTE
PRIMARY PROBLEM: Low back pain and bilateral leg pains    PROCEDURE: Bilateral L5-S1  Transforaminal Epidural Steroid Injections with fluoroscopic guidance and contrast.     PROCEDURE DETAILS: After written informed consent was obtained from the patient, the patient was escorted to the procedure room.  The patient was placed in the prone position.  A  time out  was conducted to verify patient identity, procedure to be performed, side, site, allergies and any special requirements.  The skin over the thoracolumbar region was prepped and draped in normal sterile fashion. Fluoroscopy was used to identify the neural foramen in AP view and the skin was anesthetized with 2 mL of 1% lidocaine with bicarbonate buffer. A 25-gauge Quincke spinal needle was advanced through this location and advanced under fluoroscopic guidance towards the neural foramen.  The target zone was the 6 o clock position of the pedicle.   Prior to entering the foramen, the depth of the needle was gauged with a lateral view on fluoroscopy. While still in a lateral view, the needle was slowly advanced to avoid injury to the spinal nerve.  Then, in the oblique view (approximately 28 degrees), after negative aspiration, 1.5 mL of Omnipaque contrast dye was injected revealing epidural spread without evidence of intravascular or intrathecal spread.  Then a 3cc solution of 10 mg of Triamcinolone in 2.75 mL of  Preservative-Free saline was slowly injected into the epidural space at each segment.  I also placed an epidural to the needle at the L5-S1 interspace from the paramedian approach and had loss of resistance with saline.  A contralateral oblique epidurogram was also confirmed at this location with Omnipaque contrast showing no evidence of any intrathecal, intraneural, or intravascular injection.  At this level I also injected another 20 mg of triamcinolone with 3 cc of preservative-free normal saline with good washout into her epidural space.  After  injection of the medication, as the needle tip was withdrawn, it was flushed with local anesthetic.   The patient was monitored with blood pressure and pulse oximetry machines with the assistance of an RN throughout the procedure.  The patient was alert and responsive to questions throughout the procedure.   The patient tolerated the procedure well and was observed in the post-procedural area.  The patient was dismissed without apparent complications.     DIAGNOSIS:  1.  Severe disc degeneration at L5-S1  2.  Grade 2 spondylolisthesis at L5-S1 causing back pain and leg pains    PLAN:  1. Performed bilateral L5-S1  transforaminal epidural steroid injections.  2. The patient was instructed to follow-up per Dr. aWlker's instructions.      Phillip Walker MD  Diplomate of the American Board of Anesthesiology, Pain Medicine

## 2019-06-20 ENCOUNTER — OFFICE VISIT (OUTPATIENT)
Dept: NEUROSURGERY | Facility: CLINIC | Age: 54
End: 2019-06-20
Payer: COMMERCIAL

## 2019-06-20 VITALS
OXYGEN SATURATION: 97 % | RESPIRATION RATE: 17 BRPM | SYSTOLIC BLOOD PRESSURE: 150 MMHG | HEIGHT: 63 IN | DIASTOLIC BLOOD PRESSURE: 80 MMHG | HEART RATE: 100 BPM | TEMPERATURE: 98.7 F | BODY MASS INDEX: 29.79 KG/M2 | WEIGHT: 168.1 LBS

## 2019-06-20 DIAGNOSIS — M43.16 SPONDYLOLISTHESIS OF LUMBAR REGION: Primary | ICD-10-CM

## 2019-06-20 PROCEDURE — 99213 OFFICE O/P EST LOW 20 MIN: CPT | Performed by: NEUROLOGICAL SURGERY

## 2019-06-20 ASSESSMENT — MIFFLIN-ST. JEOR: SCORE: 1346.63

## 2019-06-20 NOTE — PROGRESS NOTES
Zina Harrell is a 53 year old female who presents for evaluation of her chief complaint of low back and bilateral leg pain.  She describes aching low back pain, with pain that radiates into the lateral aspect of her thighs and calves bilaterally.  She notices numbness and tingling in her feet bilaterally.  Symptoms have been present for several years, with no event or injury.  She has recently been doing chiropractic care, with minimal symptomatic improvement.  She takes ibuprofen almost daily.  She denies any bowel or bladder changes, or any problems with balance or coordination.    Returns for follow up. Had a good result with recent MEGHANA.    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 colp 8/2015 WNL       Past Medical History reviewed with patient during visit.    Past Surgical History:   Procedure Laterality Date     APPENDECTOMY  1982     COLONOSCOPY N/A 7/2/2018    Procedure: COLONOSCOPY;  COLONOSCOPY;  Surgeon: Sundeep Boston MD;  Location: [facility]     INJECT EPIDURAL TRANSFORAMINAL N/A 6/13/2019    Procedure: INJECTION, EPIDURAL, TRANSFORAMINAL APPROACH LUMBAR 5- SACRAL 1;  Surgeon: Phillip Walker MD;  Location: [facility]     Past Surgical History reviewed with patient during visit.    Current Outpatient Medications   Medication     cetirizine (ZYRTEC) 10 MG tablet     citalopram (CELEXA) 10 MG tablet     FLOVENT  MCG/ACT inhaler     multivitamin, therapeutic with minerals (MULTI-VITAMIN) TABS tablet     omega-3 acid ethyl esters (LOVAZA) 1 g capsule     VENTOLIN  (90 Base) MCG/ACT inhaler     No current facility-administered medications for this visit.        Allergies   Allergen Reactions     Penicillin V      Childhood reaction unk       Social History     Socioeconomic History     Marital status:      Spouse name: Not on file     Number of children: Not on file      Years of education: Not on file     Highest education level: Not on file   Occupational History     Not on file   Social Needs     Financial resource strain: Not on file     Food insecurity:     Worry: Not on file     Inability: Not on file     Transportation needs:     Medical: Not on file     Non-medical: Not on file   Tobacco Use     Smoking status: Current Every Day Smoker     Packs/day: 0.50     Smokeless tobacco: Never Used     Tobacco comment: smoked since age 20   Substance and Sexual Activity     Alcohol use: Yes     Comment: rarely     Drug use: No     Sexual activity: Never   Lifestyle     Physical activity:     Days per week: Not on file     Minutes per session: Not on file     Stress: Not on file   Relationships     Social connections:     Talks on phone: Not on file     Gets together: Not on file     Attends Jewish service: Not on file     Active member of club or organization: Not on file     Attends meetings of clubs or organizations: Not on file     Relationship status: Not on file     Intimate partner violence:     Fear of current or ex partner: Not on file     Emotionally abused: Not on file     Physically abused: Not on file     Forced sexual activity: Not on file   Other Topics Concern     Parent/sibling w/ CABG, MI or angioplasty before 65F 55M? Not Asked   Social History Narrative     Not on file       Family History   Problem Relation Age of Onset     Arthritis Mother         RA     Hypertension Mother      Other Cancer Mother         skin cancer     Cerebrovascular Disease Maternal Grandfather      Diabetes Sister         gestational     Coronary Artery Disease No family hx of      Breast Cancer No family hx of      Colon Cancer No family hx of      Genetic Disorder No family hx of      Thyroid Disease No family hx of           ROS: 10 point ROS neg other than the symptoms noted above in the HPI.    Vital Signs: /80   Pulse 100   Temp 98.7  F (37.1  C) (Temporal)   Resp 17   Ht  "5' 3\" (1.6 m)   Wt 168 lb 1.6 oz (76.2 kg)   LMP 03/14/2016 (Within Months)   SpO2 97%   BMI 29.78 kg/m      Examination:  Constitutional:  Alert, well nourished, NAD.  HEENT: Normocephalic, atraumatic.   Pulmonary:  Without shortness of breath, normal effort.   Lymph: no lymphadenopathy to low back or LE.   Integumentary: Skin is free of rashes or lesions.   Cardiovascular:  No pitting edema of BLE.    Psych: Normal affect, no apparent distress    Neurological:  Awake  Alert  Oriented x 3  Speech clear  Cranial nerves II - XII grossly intact  Motor exam  Hip Flexor:                Right: 5/5  Left:  5/5  Hip Adductor:             Right:  5/5  Left:  5/5  Hip Abductor:             Right:  5/5  Left:  5/5  Gastroc Soleus:        Right:  5/5  Left:  5/5  Tib/Ant:                      Right:  5/5  Left:  5/5  EHL:                          Right:  5/5  Left:  5/5       Sensation normal to bilateral upper and lower extremities.    Reflexes are 2+ in the patellar and Achilles. There is no clonus. Downgoing Babinski.  Musculoskeletal:  Gait: Able to stand from a seated position. Normal non-antalgic, non-myelopathic gait.  Lumbar examination reveals no tenderness of the spine or paraspinous muscles.  Hip height is symmetrical. Negative SI joint, sciatic notch or greater trochanteric tenderness to palpation bilaterally.  Straight leg raise is negative bilaterally.      Imaging:   MRI of the lumbar spine was reviewed in the office today. There is a grade II spondylolisthesis at L5-S1 with associated severe foraminal stenosis bialterally.     Assessment/Plan:     Low back pain  Grade II spondylolisthesis at L5-S1  Bilateral foraminal stenosis L5-S1    Good pain relief with recent MEGHANA  If symptoms worsen, she will call us to pursue another injection or discuss surgery    "

## 2019-06-20 NOTE — LETTER
6/20/2019         RE: Zina Harrell  [address] [address]        Dear Colleague,    Thank you for referring your patient, Zina Harrell, to the [facility]. Please see a copy of my visit note below.     Zina Harrell is a 53 year old female who presents for evaluation of her chief complaint of low back and bilateral leg pain.  She describes aching low back pain, with pain that radiates into the lateral aspect of her thighs and calves bilaterally.  She notices numbness and tingling in her feet bilaterally.  Symptoms have been present for several years, with no event or injury.  She has recently been doing chiropractic care, with minimal symptomatic improvement.  She takes ibuprofen almost daily.  She denies any bowel or bladder changes, or any problems with balance or coordination.    Returns for follow up. Had a good result with recent MEGHANA.    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 colp 8/2015 WNL       Past Medical History reviewed with patient during visit.    Past Surgical History:   Procedure Laterality Date     APPENDECTOMY  1982     COLONOSCOPY N/A 7/2/2018    Procedure: COLONOSCOPY;  COLONOSCOPY;  Surgeon: Sundeep Boston MD;  Location: [facility]     INJECT EPIDURAL TRANSFORAMINAL N/A 6/13/2019    Procedure: INJECTION, EPIDURAL, TRANSFORAMINAL APPROACH LUMBAR 5- SACRAL 1;  Surgeon: Phillip Walker MD;  Location: [facility]     Past Surgical History reviewed with patient during visit.    Current Outpatient Medications   Medication     cetirizine (ZYRTEC) 10 MG tablet     citalopram (CELEXA) 10 MG tablet     FLOVENT  MCG/ACT inhaler     multivitamin, therapeutic with minerals (MULTI-VITAMIN) TABS tablet     omega-3 acid ethyl esters (LOVAZA) 1 g capsule     VENTOLIN  (90 Base) MCG/ACT inhaler     No current facility-administered  medications for this visit.        Allergies   Allergen Reactions     Penicillin V      Childhood reaction unk       Social History     Socioeconomic History     Marital status:      Spouse name: Not on file     Number of children: Not on file     Years of education: Not on file     Highest education level: Not on file   Occupational History     Not on file   Social Needs     Financial resource strain: Not on file     Food insecurity:     Worry: Not on file     Inability: Not on file     Transportation needs:     Medical: Not on file     Non-medical: Not on file   Tobacco Use     Smoking status: Current Every Day Smoker     Packs/day: 0.50     Smokeless tobacco: Never Used     Tobacco comment: smoked since age 20   Substance and Sexual Activity     Alcohol use: Yes     Comment: rarely     Drug use: No     Sexual activity: Never   Lifestyle     Physical activity:     Days per week: Not on file     Minutes per session: Not on file     Stress: Not on file   Relationships     Social connections:     Talks on phone: Not on file     Gets together: Not on file     Attends Yazidism service: Not on file     Active member of club or organization: Not on file     Attends meetings of clubs or organizations: Not on file     Relationship status: Not on file     Intimate partner violence:     Fear of current or ex partner: Not on file     Emotionally abused: Not on file     Physically abused: Not on file     Forced sexual activity: Not on file   Other Topics Concern     Parent/sibling w/ CABG, MI or angioplasty before 65F 55M? Not Asked   Social History Narrative     Not on file       Family History   Problem Relation Age of Onset     Arthritis Mother         RA     Hypertension Mother      Other Cancer Mother         skin cancer     Cerebrovascular Disease Maternal Grandfather      Diabetes Sister         gestational     Coronary Artery Disease No family hx of      Breast Cancer No family hx of      Colon Cancer No family  "hx of      Genetic Disorder No family hx of      Thyroid Disease No family hx of           ROS: 10 point ROS neg other than the symptoms noted above in the HPI.    Vital Signs: /80   Pulse 100   Temp 98.7  F (37.1  C) (Temporal)   Resp 17   Ht 5' 3\" (1.6 m)   Wt 168 lb 1.6 oz (76.2 kg)   LMP 03/14/2016 (Within Months)   SpO2 97%   BMI 29.78 kg/m       Examination:  Constitutional:  Alert, well nourished, NAD.  HEENT: Normocephalic, atraumatic.   Pulmonary:  Without shortness of breath, normal effort.   Lymph: no lymphadenopathy to low back or LE.   Integumentary: Skin is free of rashes or lesions.   Cardiovascular:  No pitting edema of BLE.    Psych: Normal affect, no apparent distress    Neurological:  Awake  Alert  Oriented x 3  Speech clear  Cranial nerves II - XII grossly intact  Motor exam  Hip Flexor:                Right: 5/5  Left:  5/5  Hip Adductor:             Right:  5/5  Left:  5/5  Hip Abductor:             Right:  5/5  Left:  5/5  Gastroc Soleus:        Right:  5/5  Left:  5/5  Tib/Ant:                      Right:  5/5  Left:  5/5  EHL:                          Right:  5/5  Left:  5/5       Sensation normal to bilateral upper and lower extremities.    Reflexes are 2+ in the patellar and Achilles. There is no clonus. Downgoing Babinski.  Musculoskeletal:  Gait: Able to stand from a seated position. Normal non-antalgic, non-myelopathic gait.  Lumbar examination reveals no tenderness of the spine or paraspinous muscles.  Hip height is symmetrical. Negative SI joint, sciatic notch or greater trochanteric tenderness to palpation bilaterally.  Straight leg raise is negative bilaterally.      Imaging:   MRI of the lumbar spine was reviewed in the office today. There is a grade II spondylolisthesis at L5-S1 with associated severe foraminal stenosis bialterally.     Assessment/Plan:     Low back pain  Grade II spondylolisthesis at L5-S1  Bilateral foraminal stenosis L5-S1    Good pain relief with " recent MEGHANA  If symptoms worsen, she will call us to pursue another injection or discuss surgery      Again, thank you for allowing me to participate in the care of your patient.        Sincerely,        Stuart Warner MD

## 2019-07-15 DIAGNOSIS — J30.2 CHRONIC SEASONAL ALLERGIC RHINITIS: ICD-10-CM

## 2019-07-15 RX ORDER — CETIRIZINE HYDROCHLORIDE 10 MG/1
TABLET ORAL
Qty: 30 TABLET | Refills: 5 | Status: SHIPPED | OUTPATIENT
Start: 2019-07-15 | End: 2020-08-18

## 2019-07-15 NOTE — TELEPHONE ENCOUNTER
"Zyrtec  Last Written Prescription Date:  12/21/2018  Last Fill Quantity: 30,  # refills: 5   Last office visit: 6/12/2019 with prescribing provider:  Alvarado   Future Office Visit:  None  Prescription approved per [facility] [place].    Requested Prescriptions   Pending Prescriptions Disp Refills     cetirizine (ZYRTEC) 10 MG tablet [Pharmacy Med Name: CETIRIZINE HCL 10 MG TABS 10 TAB] 30 tablet 5     Sig: TAKE ONE TABLET BY MOUTH EACH EVENING       Antihistamines Protocol Passed - 7/15/2019  4:18 PM        Passed - Patient is 3-64 years of age     Apply weight-based dosing for peds patients age 3 - 12 years of age.  Forward request to provider for patients under the age of 3 or over the age of 64.        Passed - Recent (12 mo) or future (30 days) visit within the authorizing provider's specialty     Patient had office visit in the last 12 months or has a visit in the next 30 days with authorizing provider or within the authorizing provider's specialty.  See \"Patient Info\" tab in inbasket, or \"Choose Columns\" in Meds & Orders section of the refill encounter.          Passed - Medication is active on med list      Maty Wood RN   "

## 2019-09-19 ENCOUNTER — OFFICE VISIT (OUTPATIENT)
Dept: RHEUMATOLOGY | Facility: CLINIC | Age: 54
End: 2019-09-19
Attending: INTERNAL MEDICINE
Payer: COMMERCIAL

## 2019-09-19 VITALS
OXYGEN SATURATION: 99 % | SYSTOLIC BLOOD PRESSURE: 123 MMHG | BODY MASS INDEX: 30.49 KG/M2 | WEIGHT: 178.6 LBS | HEIGHT: 64 IN | HEART RATE: 86 BPM | DIASTOLIC BLOOD PRESSURE: 82 MMHG

## 2019-09-19 DIAGNOSIS — M19.041 PRIMARY OSTEOARTHRITIS OF BOTH HANDS: ICD-10-CM

## 2019-09-19 DIAGNOSIS — M19.049 HAND ARTHRITIS: Primary | ICD-10-CM

## 2019-09-19 DIAGNOSIS — M19.042 PRIMARY OSTEOARTHRITIS OF BOTH HANDS: ICD-10-CM

## 2019-09-19 PROCEDURE — 36415 COLL VENOUS BLD VENIPUNCTURE: CPT | Performed by: STUDENT IN AN ORGANIZED HEALTH CARE EDUCATION/TRAINING PROGRAM

## 2019-09-19 PROCEDURE — 86200 CCP ANTIBODY: CPT | Performed by: STUDENT IN AN ORGANIZED HEALTH CARE EDUCATION/TRAINING PROGRAM

## 2019-09-19 PROCEDURE — 99204 OFFICE O/P NEW MOD 45 MIN: CPT | Performed by: STUDENT IN AN ORGANIZED HEALTH CARE EDUCATION/TRAINING PROGRAM

## 2019-09-19 ASSESSMENT — ROUTINE ASSESSMENT OF PATIENT INDEX DATA (RAPID3)
RAPID3 INTERPRETATION: HIGH > 12.0
TOTAL RAPID3 SCORE: 18.8

## 2019-09-19 ASSESSMENT — MIFFLIN-ST. JEOR: SCORE: 1410.12

## 2019-09-19 NOTE — LETTER
October 7, 2019      Zina Harrell  [address]  [place] [address]        Dear MsBatshevaSandwich,    We are writing to inform you of your test results.      Test for Rheumatoid arthritis is negative.     Resulted Orders   Cyclic Citrullinated Peptide Antibody IgG   Result Value Ref Range    Cyclic Citrullinated Peptide Antibody, IgG 1 <7 U/mL      Comment:      Negative       If you have any questions or concerns, please call the clinic at the number listed above.       Sincerely,        Kurt Vance MD

## 2019-09-19 NOTE — PATIENT INSTRUCTIONS
-- Will do Anti CCP antibody test today for RA. But your hand arthritis is related to Osteoarthritis, not Rheumatoid arthritis.     -- Positive SALBADOR is most likely false positive. You do not have any features suggestive of SALBADOR associated autoimmune disease. Hence I am not ordering specific autoimmune serologies.     -- You can see hand therapy for primary Osteoarthritis of hands.     -- RTC on as needed basis.

## 2019-09-19 NOTE — PROGRESS NOTES
Rheumatology Clinic Visit     Zina Harrell MRN# [id number]   YOB: 1965 Age: 53 year old     Date of Visit: Sep 19, 2019   Primary care provider: Lebron Childers          Assessment and Plan:     Assessment     -- Hand Osteoarthritis  -- Lumbar DDD  -- Anxiety and depression  -- + SALBADOR - 1:80 speckled  -- Neg RF     Ms. Harrell is 53 year old female seen in clinic for evaluation of hand pain in the setting og positive SALBADOR.     Hand arthritis : She has pain in her hands for the last for 5 years, progressively getting worse.  She has noticed bumps on her fingers and deformities.  Her mother had similar hands.  She denies constant pain in her fingers but gets mild ache when making a fist or squeezing the fingers.  She takes Tylenol or ibuprofen on a daily basis for her back pain and headaches.  X-ray of bilateral hands done in March 2019 showed presence of narrowing, subchondral sclerosis, marginal osteophytes in the PIP DIP joint suggestive of osteoarthritis.  She has no involvement of MCP joints.    On physical exam she has presence of Heberden and Darshana nodes over the DIP PIP joints.  She has flexion deformity at the PIP joints of bilateral fifth fingers.  No synovitis is noted over the small joints of hands.  Mild tenderness present over bilateral fourth PIP joints.  No tenderness present over bilateral CMC joint.  No synovitis tenderness present bilateral wrists, elbows.  Normal range of motion of bilateral shoulders.  No synovitis noted over bilateral ankles.  She has mild tenderness present over the MTP joints.  Has 2+ pedal edema.  No psoriasis noted on exam.    Based on the evaluation inflammatory arthritis like RA is less likely.  Her hand pain is related to osteoarthritis.    To complete the work-up I will get anti-CCP antibody which is a specific test for rheumatoid arthritis.  Her rheumatoid factor checked in March 2019 was negative.  I gave her Voltaren gel prescription to be used on  as-needed basis for hand pain.  She can see hand therapy clinic for hand osteoarthritis.  She does not think pain in her hands is significant enough at this point but she will definitely consider to see hand therapist in the future if needed.    Positive SALBADOR: She has low titer positive SALBADOR 1: 80 speckled.  Other than positive SALBADOR she has no symptoms suggestive of SALBADOR associated autoimmune disease.  Denies photosensitivity, malar rash, oral/nasal mucosal sores, sicca symptoms, pleurisy, raynaud's.  Her positive SALBADOR is most likely false positive.  I am not ordering any specific autoimmune serologies at this point.  If she develop any above-mentioned symptoms over time then can come back in rheumatology clinic for reevaluation.    Plan    -- Will do Anti CCP antibody test today for RA. But your hand arthritis is related to Osteoarthritis, not Rheumatoid arthritis.     -- Positive SALBADOR is most likely false positive. You do not have any features suggestive of SALBADOR associated autoimmune disease. Hence I am not ordering specific autoimmune serologies.     -- You can see hand therapy for primary Osteoarthritis of hands.     -- RTC on as needed basis.               Active Problem List:     Patient Active Problem List    Diagnosis Date Noted     Adjustment disorder with mixed anxiety and depressed mood 09/14/2016     Priority: Medium     Cervical high risk HPV (human papillomavirus) test positive 06/05/2015     Priority: Medium     6/5/15 normal pap/+ HR HPV (not 16 or 18). Plan: repeat pap and HPV testing in 1 year.   6/25/16 Dr. Kaye, recommended that patient have colposcopy.  8/6/15 [place] bx: negative. ECC: negative. Pap: NIL/neg HPV. Plan: cotest in 1 yr.  9/14/16 NIL pap/neg HR HPV. Plan: Repeat pap and HPV in 1 year.  11/27/17 NIL pap/neg HR HPV. Plan: cotest due 3 yr         CARDIOVASCULAR SCREENING; LDL GOAL LESS THAN 160 06/04/2012     Priority: Medium     Tobacco abuse 06/04/2012     Priority: Medium     Asthma,  intermittent controlled      Priority: Medium     age 16 hospitalized  Problem list name updated by automated process. Provider to review and confirm              History of Present Illness:   Zina Harrell is a 53 year old female with PMH of anxiety, depression, joint pains, chronic back pain seen in the clinic in consultation at request of Dr Childers for evaluation of joint pains.     She has pain in her hands for the last few years. She feel that her knuckles are huge. Her mother was diagnosed with RA. She denies pain in her other joints, mild pain in her knees and ankles. Sometimes her ankle start hurting and makes her limp. Hand pain is not there at rest but when she squeeze her fingers or make a fist feels some pain. Sometime her muscle in her arm or chest area freeze up which she attributes to her back. She does not think that her symptoms are worse in the morning. She takes Tylenol 2 - 3 tab daily and Ibuprofen 6 tab daily. But she does not think that it helps her. Her ring finger in both hands hurt occasionally.     She has chronic neck and lower back pain. Has hx of Whiplash injury as a child. She has swelling over her feet.     She denies any fatigue, fever, chills or weight loss. No history of psoriasis, ulcerative colitis or chron's disease. No h/o iritis, enthesitis, finger or toe swelling like dactylitis, plantar fascitis or heel pain. Denies any raynauds, malar rash, photosensitivity, recurrent mouth/genital ulcers, sicca symptoms, pleuritic chest pains, recurrent sinusitis/rhinitis, swallowing difficulty, hearing or visual changes recently.   No h/o arterial/venous thrombosis in the past. Denies any tick bite, recent GI/ infection.             Review of Systems:     Review Of Systems  Constitutional: denies fever, chills, night sweats and weight loss.  Skin: No skin rash.  Eyes: No dryness or irritation in eyes. No episode of eye inflammation or redness.   Ears/Nose/Throat: no recurrent sinus  infections.  Respiratory: No shortness of breath, dyspnea on exertion, cough, or hemoptysis  Cardiovascular: no chest pain or palpitations.  Gastrointestinal: no nausea, vomiting, abdominal pain.  Normal bowel movements.  Genitourinary: no dysuria, frequency  or hematuria.  Musculoskeletal: as in HPI  Neurologic: no numbness, tingling.  Psychiatric: no mood disorders.  Hematologic/Lymphatic/Immunologic: no history of easy bruising, petechia or purpura.  No abnormal bleeding.   Endocrine: no h/o thyroid disease or Diabetes.                  Past Medical History:     Past Medical History:   Diagnosis Date     Asthma, intermittent controlled age 8    age 16 hospitalized     High risk HPV infection 6/5/15    normal pap. colp 8/2015 WNL     Past Surgical History:   Procedure Laterality Date     APPENDECTOMY  1982     COLONOSCOPY N/A 7/2/2018    Procedure: COLONOSCOPY;  COLONOSCOPY;  Surgeon: Sundeep Boston MD;  Location: [facility]     INJECT EPIDURAL TRANSFORAMINAL N/A 6/13/2019    Procedure: INJECTION, EPIDURAL, TRANSFORAMINAL APPROACH LUMBAR 5- SACRAL 1;  Surgeon: Phillip Walker MD;  Location:  OR            Social History:     Social History     Occupational History     Not on file   Tobacco Use     Smoking status: Current Every Day Smoker     Packs/day: 0.50     Smokeless tobacco: Never Used     Tobacco comment: smoked since age 20   Substance and Sexual Activity     Alcohol use: Yes     Comment: rarely     Drug use: No     Sexual activity: Never            Family History:     Family History   Problem Relation Age of Onset     Arthritis Mother         RA     Hypertension Mother      Other Cancer Mother         skin cancer     Cerebrovascular Disease Maternal Grandfather      Diabetes Sister         gestational     Coronary Artery Disease No family hx of      Breast Cancer No family hx of      Colon Cancer No family hx of      Genetic Disorder No family hx of      Thyroid Disease No family hx of              "Allergies:     Allergies   Allergen Reactions     Penicillin V      Childhood reaction unk            Medications:     Current Outpatient Medications   Medication Sig Dispense Refill     cetirizine (ZYRTEC) 10 MG tablet TAKE ONE TABLET BY MOUTH EACH EVENING 30 tablet 5     citalopram (CELEXA) 10 MG tablet Take 1 tablet (10 mg) by mouth daily 30 tablet 3     FLOVENT  MCG/ACT inhaler INHALE 2 PUFFS INTO THE LUNGS 2 TIMES DAILY 36 g 2     multivitamin, therapeutic with minerals (MULTI-VITAMIN) TABS tablet Take 1 tablet by mouth daily       omega-3 acid ethyl esters (LOVAZA) 1 g capsule Take 2 g by mouth 2 times daily       VENTOLIN  (90 Base) MCG/ACT inhaler INHALE 2 PUFFS INTO THE LUNGS EVERY 4 HOURS AS NEEDED FOR SHORTNESS OF BREATH/DYSPNEA OR WHEEZING 54 g 2            Physical Exam:   Blood pressure 123/82, pulse 86, height 1.626 m (5' 4\"), weight 81 kg (178 lb 9.6 oz), last menstrual period 03/14/2016, SpO2 99 %, not currently breastfeeding.  Wt Readings from Last 4 Encounters:   09/19/19 81 kg (178 lb 9.6 oz)   06/20/19 76.2 kg (168 lb 1.6 oz)   06/12/19 75.8 kg (167 lb)   05/17/19 73.9 kg (163 lb)       Constitutional: obese, appearing stated age; cooperative  Eyes: nl EOM, PERRLA, vision, conjunctiva, sclera  ENT: nl external ears, nose, hearing, lips, teeth, gums, throat  No mucous membrane lesions, normal saliva pool  Neck: no mass or thyroid enlargement  Resp: lungs clear to auscultation, nl to palpation  CV: RRR, no murmurs, rubs or gallops, no edema  GI: no ABD mass or tenderness, no HSM  : not tested  Lymph: no cervical, supraclavicular, inguinal or epitrochlear nodes    MS: All TMJ, neck, shoulder, elbow, wrist, MCP/PIP/DIP, spine, hip, knee, ankle, and foot MTP/IP joints were examined.     -- She has presence of Heberden and Darshana nodes over the DIP PIP joints.  She has flexion deformity at the PIP joints of bilateral fifth fingers.  No synovitis is noted over the small joints of " hands.  Mild tenderness present over bilateral fourth PIP joints.  No tenderness present over bilateral CMC joint.  No synovitis tenderness present bilateral wrists, elbows.  Normal range of motion of bilateral shoulders.  No synovitis noted over bilateral ankles.  She has mild tenderness present over the MTP joints.  Has 2+ pedal edema.  No psoriasis noted on exam.    -- No dactylitis,  tenosynovitis, enthesopathy.    Skin: no nail pitting, alopecia, rash, nodules or lesions  Neuro: nl cranial nerves, strength, sensation, DTRs.   Psych: nl judgement, orientation, memory, affect.         Data:     Results for orders placed or performed during the hospital encounter of 06/13/19   XR Surgery SHARI L/T 5 Min Fluoro w Stills    Narrative    This exam was marked as non-reportable because it will not be read by a   radiologist or a [place] non-radiologist provider.                 Recent Labs   Lab Test 03/19/19  1102 03/15/19  1611 11/27/17  1249 09/14/16  1431   WBC  --  5.3  --   --    RBC  --  4.17  --   --    HGB  --  13.4  --   --    HCT  --  40.8  --   --    MCV  --  98  --   --    RDW  --  14.0  --   --    PLT  --  277  --   --    ALBUMIN  --  3.6 3.8 4.0   CRP <2.9  --   --   --    BUN  --  10 10 13      Recent Labs   Lab Test 03/15/19  1611 06/05/15  1028   TSH 0.81 0.54     Hemoglobin   Date Value Ref Range Status   03/15/2019 13.4 11.7 - 15.7 g/dL Final     Urea Nitrogen   Date Value Ref Range Status   03/15/2019 10 7 - 30 mg/dL Final   11/27/2017 10 7 - 30 mg/dL Final   09/14/2016 13 7 - 30 mg/dL Final     Sed Rate   Date Value Ref Range Status   03/19/2019 7 0 - 30 mm/h Final     CRP Inflammation   Date Value Ref Range Status   03/19/2019 <2.9 0.0 - 8.0 mg/L Final     AST   Date Value Ref Range Status   03/15/2019 30 0 - 45 U/L Final   11/27/2017 23 0 - 45 U/L Final   09/14/2016 26 0 - 45 U/L Final     Albumin   Date Value Ref Range Status   03/15/2019 3.6 3.4 - 5.0 g/dL Final   11/27/2017 3.8 3.4 - 5.0 g/dL  Final   09/14/2016 4.0 3.4 - 5.0 g/dL Final     Alkaline Phosphatase   Date Value Ref Range Status   03/15/2019 92 40 - 150 U/L Final   11/27/2017 94 40 - 150 U/L Final   09/14/2016 84 40 - 150 U/L Final     ALT   Date Value Ref Range Status   03/15/2019 37 0 - 50 U/L Final   11/27/2017 36 0 - 50 U/L Final   09/14/2016 43 0 - 50 U/L Final     Rheumatoid Factor   Date Value Ref Range Status   03/19/2019 <20 <20 IU/mL Final     Recent Labs   Lab Test 03/15/19  1611 11/27/17  1249 09/14/16  1431 06/05/15  1028   WBC 5.3  --   --   --    HGB 13.4  --   --   --    HCT 40.8  --   --   --    MCV 98  --   --   --      --   --   --    BUN 10 10 13 15   TSH 0.81  --   --  0.54   AST 30 23 26  --    ALT 37 36 43  --    ALKPHOS 92 94 84  --        Reviewed Rheumatology lab flowsheet    Kurt Vance MD  [facility]  Department of Rheumatology & Autoimmune Disorders  [employer] [place]: [phone]   Pager - [phone]

## 2019-09-19 NOTE — LETTER
9/19/2019      RE: Zina Harrell  [address] [facility] [address]     Dear Colleague,    Thank you for referring your patient, Zina Harrell, to the [facility]. Please see a copy of my visit note below.    Rheumatology Clinic Visit     Zina Harrell MRN# [id number]   YOB: 1965 Age: 53 year old     Date of Visit: Sep 19, 2019   Primary care provider: Lebron Childers          Assessment and Plan:     Assessment     -- Hand Osteoarthritis  -- Lumbar DDD  -- Anxiety and depression  -- + SALBADOR - 1:80 speckled  -- Neg RF     Ms. Harrell is 53 year old female seen in clinic for evaluation of hand pain in the setting og positive SALBADOR.     Hand arthritis : She has pain in her hands for the last for 5 years, progressively getting worse.  She has noticed bumps on her fingers and deformities.  Her mother had similar hands.  She denies constant pain in her fingers but gets mild ache when making a fist or squeezing the fingers.  She takes Tylenol or ibuprofen on a daily basis for her back pain and headaches.  X-ray of bilateral hands done in March 2019 showed presence of narrowing, subchondral sclerosis, marginal osteophytes in the PIP DIP joint suggestive of osteoarthritis.  She has no involvement of MCP joints.    On physical exam she has presence of Heberden and Darshana nodes over the DIP PIP joints.  She has flexion deformity at the PIP joints of bilateral fifth fingers.  No synovitis is noted over the small joints of hands.  Mild tenderness present over bilateral fourth PIP joints.  No tenderness present over bilateral CMC joint.  No synovitis tenderness present bilateral wrists, elbows.  Normal range of motion of bilateral shoulders.  No synovitis noted over bilateral ankles.  She has mild tenderness present over the MTP joints.  Has 2+ pedal edema.  No psoriasis noted on exam.    Based on the evaluation inflammatory arthritis like RA is less likely.  Her hand pain is  related to osteoarthritis.    To complete the work-up I will get anti-CCP antibody which is a specific test for rheumatoid arthritis.  Her rheumatoid factor checked in March 2019 was negative.  I gave her Voltaren gel prescription to be used on as-needed basis for hand pain.  She can see hand therapy clinic for hand osteoarthritis.  She does not think pain in her hands is significant enough at this point but she will definitely consider to see hand therapist in the future if needed.    Positive SALBADOR: She has low titer positive SALBADOR 1: 80 speckled.  Other than positive SALBADOR she has no symptoms suggestive of SALBADOR associated autoimmune disease.  Denies photosensitivity, malar rash, oral/nasal mucosal sores, sicca symptoms, pleurisy, raynaud's.  Her positive SALBADOR is most likely false positive.  I am not ordering any specific autoimmune serologies at this point.  If she develop any above-mentioned symptoms over time then can come back in rheumatology clinic for reevaluation.    Plan    -- Will do Anti CCP antibody test today for RA. But your hand arthritis is related to Osteoarthritis, not Rheumatoid arthritis.     -- Positive SALBADOR is most likely false positive. You do not have any features suggestive of SALBADOR associated autoimmune disease. Hence I am not ordering specific autoimmune serologies.     -- You can see hand therapy for primary Osteoarthritis of hands.     -- RTC on as needed basis.               Active Problem List:     Patient Active Problem List    Diagnosis Date Noted     Adjustment disorder with mixed anxiety and depressed mood 09/14/2016     Priority: Medium     Cervical high risk HPV (human papillomavirus) test positive 06/05/2015     Priority: Medium     6/5/15 normal pap/+ HR HPV (not 16 or 18). Plan: repeat pap and HPV testing in 1 year.   6/25/16 Dr. Kaye, recommended that patient have colposcopy.  8/6/15 [place] bx: negative. ECC: negative. Pap: NIL/neg HPV. Plan: cotest in 1 yr.  9/14/16 NIL pap/neg HR  HPV. Plan: Repeat pap and HPV in 1 year.  11/27/17 NIL pap/neg HR HPV. Plan: cotest due 3 yr         CARDIOVASCULAR SCREENING; LDL GOAL LESS THAN 160 06/04/2012     Priority: Medium     Tobacco abuse 06/04/2012     Priority: Medium     Asthma, intermittent controlled      Priority: Medium     age 16 hospitalized  Problem list name updated by automated process. Provider to review and confirm              History of Present Illness:   Zina Harrell is a 53 year old female with PMH of anxiety, depression, joint pains, chronic back pain seen in the clinic in consultation at request of Dr Childers for evaluation of joint pains.     She has pain in her hands for the last few years. She feel that her knuckles are huge. Her mother was diagnosed with RA. She denies pain in her other joints, mild pain in her knees and ankles. Sometimes her ankle start hurting and makes her limp. Hand pain is not there at rest but when she squeeze her fingers or make a fist feels some pain. Sometime her muscle in her arm or chest area freeze up which she attributes to her back. She does not think that her symptoms are worse in the morning. She takes Tylenol 2 - 3 tab daily and Ibuprofen 6 tab daily. But she does not think that it helps her. Her ring finger in both hands hurt occasionally.     She has chronic neck and lower back pain. Has hx of Whiplash injury as a child. She has swelling over her feet.     She denies any fatigue, fever, chills or weight loss. No history of psoriasis, ulcerative colitis or chron's disease. No h/o iritis, enthesitis, finger or toe swelling like dactylitis, plantar fascitis or heel pain. Denies any raynauds, malar rash, photosensitivity, recurrent mouth/genital ulcers, sicca symptoms, pleuritic chest pains, recurrent sinusitis/rhinitis, swallowing difficulty, hearing or visual changes recently.   No h/o arterial/venous thrombosis in the past. Denies any tick bite, recent GI/ infection.             Review of  Systems:     Review Of Systems  Constitutional: denies fever, chills, night sweats and weight loss.  Skin: No skin rash.  Eyes: No dryness or irritation in eyes. No episode of eye inflammation or redness.   Ears/Nose/Throat: no recurrent sinus infections.  Respiratory: No shortness of breath, dyspnea on exertion, cough, or hemoptysis  Cardiovascular: no chest pain or palpitations.  Gastrointestinal: no nausea, vomiting, abdominal pain.  Normal bowel movements.  Genitourinary: no dysuria, frequency  or hematuria.  Musculoskeletal: as in HPI  Neurologic: no numbness, tingling.  Psychiatric: no mood disorders.  Hematologic/Lymphatic/Immunologic: no history of easy bruising, petechia or purpura.  No abnormal bleeding.   Endocrine: no h/o thyroid disease or Diabetes.                  Past Medical History:     Past Medical History:   Diagnosis Date     Asthma, intermittent controlled age 8    age 16 hospitalized     High risk HPV infection 6/5/15    normal pap. colp 8/2015 WNL     Past Surgical History:   Procedure Laterality Date     APPENDECTOMY  1982     COLONOSCOPY N/A 7/2/2018    Procedure: COLONOSCOPY;  COLONOSCOPY;  Surgeon: Sundeep Boston MD;  Location:  GI     INJECT EPIDURAL TRANSFORAMINAL N/A 6/13/2019    Procedure: INJECTION, EPIDURAL, TRANSFORAMINAL APPROACH LUMBAR 5- SACRAL 1;  Surgeon: Phillip Walker MD;  Location:  OR            Social History:     Social History     Occupational History     Not on file   Tobacco Use     Smoking status: Current Every Day Smoker     Packs/day: 0.50     Smokeless tobacco: Never Used     Tobacco comment: smoked since age 20   Substance and Sexual Activity     Alcohol use: Yes     Comment: rarely     Drug use: No     Sexual activity: Never            Family History:     Family History   Problem Relation Age of Onset     Arthritis Mother         RA     Hypertension Mother      Other Cancer Mother         skin cancer     Cerebrovascular Disease Maternal Grandfather   "    Diabetes Sister         gestational     Coronary Artery Disease No family hx of      Breast Cancer No family hx of      Colon Cancer No family hx of      Genetic Disorder No family hx of      Thyroid Disease No family hx of             Allergies:     Allergies   Allergen Reactions     Penicillin V      Childhood reaction unk            Medications:     Current Outpatient Medications   Medication Sig Dispense Refill     cetirizine (ZYRTEC) 10 MG tablet TAKE ONE TABLET BY MOUTH EACH EVENING 30 tablet 5     citalopram (CELEXA) 10 MG tablet Take 1 tablet (10 mg) by mouth daily 30 tablet 3     FLOVENT  MCG/ACT inhaler INHALE 2 PUFFS INTO THE LUNGS 2 TIMES DAILY 36 g 2     multivitamin, therapeutic with minerals (MULTI-VITAMIN) TABS tablet Take 1 tablet by mouth daily       omega-3 acid ethyl esters (LOVAZA) 1 g capsule Take 2 g by mouth 2 times daily       VENTOLIN  (90 Base) MCG/ACT inhaler INHALE 2 PUFFS INTO THE LUNGS EVERY 4 HOURS AS NEEDED FOR SHORTNESS OF BREATH/DYSPNEA OR WHEEZING 54 g 2            Physical Exam:   Blood pressure 123/82, pulse 86, height 1.626 m (5' 4\"), weight 81 kg (178 lb 9.6 oz), last menstrual period 03/14/2016, SpO2 99 %, not currently breastfeeding.  Wt Readings from Last 4 Encounters:   09/19/19 81 kg (178 lb 9.6 oz)   06/20/19 76.2 kg (168 lb 1.6 oz)   06/12/19 75.8 kg (167 lb)   05/17/19 73.9 kg (163 lb)       Constitutional: obese, appearing stated age; cooperative  Eyes: nl EOM, PERRLA, vision, conjunctiva, sclera  ENT: nl external ears, nose, hearing, lips, teeth, gums, throat  No mucous membrane lesions, normal saliva pool  Neck: no mass or thyroid enlargement  Resp: lungs clear to auscultation, nl to palpation  CV: RRR, no murmurs, rubs or gallops, no edema  GI: no ABD mass or tenderness, no HSM  : not tested  Lymph: no cervical, supraclavicular, inguinal or epitrochlear nodes    MS: All TMJ, neck, shoulder, elbow, wrist, MCP/PIP/DIP, spine, hip, knee, ankle, and " foot MTP/IP joints were examined.     -- She has presence of Heberden and Darshana nodes over the DIP PIP joints.  She has flexion deformity at the PIP joints of bilateral fifth fingers.  No synovitis is noted over the small joints of hands.  Mild tenderness present over bilateral fourth PIP joints.  No tenderness present over bilateral CMC joint.  No synovitis tenderness present bilateral wrists, elbows.  Normal range of motion of bilateral shoulders.  No synovitis noted over bilateral ankles.  She has mild tenderness present over the MTP joints.  Has 2+ pedal edema.  No psoriasis noted on exam.    -- No dactylitis,  tenosynovitis, enthesopathy.    Skin: no nail pitting, alopecia, rash, nodules or lesions  Neuro: nl cranial nerves, strength, sensation, DTRs.   Psych: nl judgement, orientation, memory, affect.         Data:     Results for orders placed or performed during the hospital encounter of 06/13/19   XR Surgery SHARI L/T 5 Min Fluoro w Stills    Narrative    This exam was marked as non-reportable because it will not be read by a   radiologist or a [place] non-radiologist provider.                 Recent Labs   Lab Test 03/19/19  1102 03/15/19  1611 11/27/17  1249 09/14/16  1431   WBC  --  5.3  --   --    RBC  --  4.17  --   --    HGB  --  13.4  --   --    HCT  --  40.8  --   --    MCV  --  98  --   --    RDW  --  14.0  --   --    PLT  --  277  --   --    ALBUMIN  --  3.6 3.8 4.0   CRP <2.9  --   --   --    BUN  --  10 10 13      Recent Labs   Lab Test 03/15/19  1611 06/05/15  1028   TSH 0.81 0.54     Hemoglobin   Date Value Ref Range Status   03/15/2019 13.4 11.7 - 15.7 g/dL Final     Urea Nitrogen   Date Value Ref Range Status   03/15/2019 10 7 - 30 mg/dL Final   11/27/2017 10 7 - 30 mg/dL Final   09/14/2016 13 7 - 30 mg/dL Final     Sed Rate   Date Value Ref Range Status   03/19/2019 7 0 - 30 mm/h Final     CRP Inflammation   Date Value Ref Range Status   03/19/2019 <2.9 0.0 - 8.0 mg/L Final     AST    Date Value Ref Range Status   03/15/2019 30 0 - 45 U/L Final   11/27/2017 23 0 - 45 U/L Final   09/14/2016 26 0 - 45 U/L Final     Albumin   Date Value Ref Range Status   03/15/2019 3.6 3.4 - 5.0 g/dL Final   11/27/2017 3.8 3.4 - 5.0 g/dL Final   09/14/2016 4.0 3.4 - 5.0 g/dL Final     Alkaline Phosphatase   Date Value Ref Range Status   03/15/2019 92 40 - 150 U/L Final   11/27/2017 94 40 - 150 U/L Final   09/14/2016 84 40 - 150 U/L Final     ALT   Date Value Ref Range Status   03/15/2019 37 0 - 50 U/L Final   11/27/2017 36 0 - 50 U/L Final   09/14/2016 43 0 - 50 U/L Final     Rheumatoid Factor   Date Value Ref Range Status   03/19/2019 <20 <20 IU/mL Final     Recent Labs   Lab Test 03/15/19  1611 11/27/17  1249 09/14/16  1431 06/05/15  1028   WBC 5.3  --   --   --    HGB 13.4  --   --   --    HCT 40.8  --   --   --    MCV 98  --   --   --      --   --   --    BUN 10 10 13 15   TSH 0.81  --   --  0.54   AST 30 23 26  --    ALT 37 36 43  --    ALKPHOS 92 94 84  --        Reviewed Rheumatology lab flowsheet    Kurt Vance MD  [facility]  Department of Rheumatology & Autoimmune Disorders  [facility]: [phone]   Pager - [phone]        Again, thank you for allowing me to participate in the care of your patient.      Sincerely,    Kurt Vance MD

## 2019-09-19 NOTE — NURSING NOTE
"Zina Harrell's goals for this visit include:   She requests these members of her care team be copied on today's visit information:     PCP: Lerbon Childers    Referring Provider:  Lebron Childers MD  [address] [place] [address]    /82   Pulse 86   Ht 1.626 m (5' 4\")   Wt 81 kg (178 lb 9.6 oz)   LMP 03/14/2016 (Within Months)   SpO2 99%   BMI 30.66 kg/m     "

## 2019-09-20 ENCOUNTER — OFFICE VISIT (OUTPATIENT)
Dept: INTERNAL MEDICINE | Facility: CLINIC | Age: 54
End: 2019-09-20
Payer: COMMERCIAL

## 2019-09-20 VITALS
SYSTOLIC BLOOD PRESSURE: 132 MMHG | WEIGHT: 177 LBS | TEMPERATURE: 98.7 F | DIASTOLIC BLOOD PRESSURE: 76 MMHG | BODY MASS INDEX: 30.38 KG/M2 | HEART RATE: 94 BPM | OXYGEN SATURATION: 100 % | RESPIRATION RATE: 16 BRPM

## 2019-09-20 DIAGNOSIS — R20.2 NUMBNESS AND TINGLING OF BOTH LEGS BELOW KNEES: Primary | ICD-10-CM

## 2019-09-20 DIAGNOSIS — R20.0 NUMBNESS AND TINGLING OF BOTH LEGS BELOW KNEES: Primary | ICD-10-CM

## 2019-09-20 LAB — CCP AB SER IA-ACNC: 1 U/ML

## 2019-09-20 PROCEDURE — 99214 OFFICE O/P EST MOD 30 MIN: CPT | Performed by: INTERNAL MEDICINE

## 2019-09-20 ASSESSMENT — PAIN SCALES - GENERAL: PAINLEVEL: EXTREME PAIN (9)

## 2019-09-20 NOTE — PROGRESS NOTES
Subjective     Zina Harrell is a 53 year old female who presents to clinic today for the following health issues:    HPI   Chief Complaint   Patient presents with     Back Pain     follow up, still having some issues with back/legs/feet       She saw rheumatology yesterday hands are osteoarthritis, no lupus despite slightly high SALBADOR.  No more testing needed for SALBADOR.      Had an injection in June for her back, was done. Was upset with neurosurgery visit after the injection.  Back pain is better or not too sever unless one position.      Legs didn't change with the injection.  Maybe better before them.      Now right hip pain there with standing.  Left leg hurts, the whole leg, right leg is some. Both feet area swelling.  Has had some varicose veins in her legs.    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 colp 8/2015 WNL     Current Outpatient Medications   Medication     cetirizine (ZYRTEC) 10 MG tablet     citalopram (CELEXA) 10 MG tablet     FLOVENT  MCG/ACT inhaler     multivitamin, therapeutic with minerals (MULTI-VITAMIN) TABS tablet     omega-3 acid ethyl esters (LOVAZA) 1 g capsule     VENTOLIN  (90 Base) MCG/ACT inhaler     diclofenac (VOLTAREN) 1 % topical gel     No current facility-administered medications for this visit.      Review of Systems  Constitutional-No fevers, chills, or weight changes..  Cardiac-No chest pain or palpitations.  Respiratory-No cough, sob, or hemoptysis.  GI-No nausea, vomitting, diarrhea, constipation, or blood in the stool.  Musculoskeletal-leg pains both sides .  Neuro-Pain and Tingling.    Physical Exam  /76   Pulse 94   Temp 98.7  F (37.1  C) (Temporal)   Resp 16   Wt 80.3 kg (177 lb)   LMP 03/14/2016 (Within Months)   SpO2 100%   BMI 30.38 kg/m    General Appearance-healthy, alert, no distress  Reflexes throughout and symmetrical at  2-3+.  Lower legs are swollen at 1+ pitting edema chronic skin changes  Sensation is intact.  Joints are without erythema but there is diffuse swelling.  Positive pulses    ASSESSMENT:  53-year-old woman who had some atypical leg symptoms possible back pain therefore had an injection in June.  No real changes with the injection.  She has more leg numbness, pain that goes from her thigh down to her little toe.  She thinks it circulation but she does have good pulses in her feet despite her smoking.  I did recommend smoking cessation.  I would like to work-up with an EMG to look for nerve function possibly she has a peripheral neuropathy.  We will also do an ultrasound to look for venous disease as she has varicose veins and swelling.    Electronically signed by Lebron Childers MD

## 2019-09-21 ASSESSMENT — ASTHMA QUESTIONNAIRES: ACT_TOTALSCORE: 22

## 2019-09-23 DIAGNOSIS — F43.23 ADJUSTMENT DISORDER WITH MIXED ANXIETY AND DEPRESSED MOOD: ICD-10-CM

## 2019-09-24 ENCOUNTER — HOSPITAL ENCOUNTER (OUTPATIENT)
Dept: ULTRASOUND IMAGING | Facility: CLINIC | Age: 54
Discharge: HOME | End: 2019-09-24
Attending: INTERNAL MEDICINE | Admitting: INTERNAL MEDICINE
Payer: COMMERCIAL

## 2019-09-24 DIAGNOSIS — R20.0 NUMBNESS AND TINGLING OF BOTH LEGS BELOW KNEES: ICD-10-CM

## 2019-09-24 DIAGNOSIS — R20.2 NUMBNESS AND TINGLING OF BOTH LEGS BELOW KNEES: ICD-10-CM

## 2019-09-24 PROCEDURE — 93970 EXTREMITY STUDY: CPT

## 2019-09-25 ENCOUNTER — TELEPHONE (OUTPATIENT)
Dept: INTERNAL MEDICINE | Facility: CLINIC | Age: 54
End: 2019-09-25

## 2019-09-25 RX ORDER — CITALOPRAM HYDROBROMIDE 10 MG/1
10 TABLET ORAL DAILY
Qty: 30 TABLET | Refills: 3 | Status: SHIPPED | OUTPATIENT
Start: 2019-09-25 | End: 2020-02-26

## 2019-09-25 NOTE — TELEPHONE ENCOUNTER
Patient was seen in past 30 days, therefore forwarding to provider to approve.  Celexa 10 mg  Last Written Prescription Date:  5/3/19  Last Fill Quantity: 30,  # refills: 3   Last office visit: 9/20/2019 with prescribing provider:     Future Office Visit:  NONE    PHQ-9 SCORE 3/15/2019 5/3/2019   PHQ-9 Total Score MyChart 9 (Mild depression) -   PHQ-9 Total Score 9 9     EpiRN

## 2019-09-25 NOTE — TELEPHONE ENCOUNTER
----- Message from Lebron Childers MD sent at 9/24/2019  5:07 PM CDT -----  Ultrasound is ok, no clots, no cause of her symptoms.

## 2019-09-25 NOTE — TELEPHONE ENCOUNTER
Patient was informed that the ultrasound is ok. No clots, no cause of her symptoms.    Lee Bocanegra, CMA

## 2019-10-02 ENCOUNTER — TELEPHONE (OUTPATIENT)
Dept: RHEUMATOLOGY | Facility: CLINIC | Age: 54
End: 2019-10-02

## 2019-10-02 NOTE — TELEPHONE ENCOUNTER
Prior Authorization Specialty Medication Request    Medication/Dose: diclofenac (VOLTAREN) 1 % topical gel  Sig - Route: Apply 2 g topically 4 times daily - Topical  ICD code (if different than what is on RX):  M19.041 M19.042  Previously Tried and Failed:       Coverage information:     Subscriber: [id number] OZZIE KERR     Rel to sub: 01 - Self     Member ID: [id number]     Payor: [address] [place] [address] Ph: [phone]     Benefit plan: [facility] Ph: [phone]     Group number: [id number]     Member effective dates: from 05/01/17          KEIKO Cordreo, RN   Rheumatology Care Coordinator   [facility]

## 2019-10-02 NOTE — LETTER
October 7, 2019      To whom it concerns,     We would like to request an appeal for Diclofenac gel for Ms. Harrell. The patient has pain and inflammation resulting from arthritis in her hands. She currently takes oral NSAIDs. Increasing her oral NSAIDs is contraindicated as it could increase her risk for Gastritis and/or Kidney Disease.  Therefore, I believe it is medically appropriate for this patient to be prescribed Diclofenac gel and will provide the best outcomes for her.Thank you for your reconsideration.  We will be waiting for your response.        Kurt Vance MD   Rheumatology

## 2019-10-03 ENCOUNTER — TRANSFERRED RECORDS (OUTPATIENT)
Dept: HEALTH INFORMATION MANAGEMENT | Facility: CLINIC | Age: 54
End: 2019-10-03

## 2019-10-03 NOTE — TELEPHONE ENCOUNTER
PRIOR AUTHORIZATION DENIED    Medication: diclofenac (VOLTAREN) 1 % topical gel    Denial Date: 10/3/2019    Denial Rational: Patient must have a history of trial & failure to the formulary alternative(s) or have a contraindication or intolerance to the formulary alternatives: oral diclofenac, ibuprofe, meloxicam, naproxen        Appeal Information:

## 2019-10-03 NOTE — TELEPHONE ENCOUNTER
PA Initiation    Medication: diclofenac (VOLTAREN) 1 % topical gel  Insurance Company: [facility] - Phone [phone] Fax [phone]  Pharmacy Filling the Rx: [facility] [place] - [address] [address]  Filling Pharmacy Phone: [phone]  Filling Pharmacy Fax:    Start Date: 10/3/2019    [place] Prior Authorization Team   Phone: [phone]

## 2019-10-07 NOTE — TELEPHONE ENCOUNTER
She takes Ibuprofen daily up to 6 tablets ( 200 mg each ). It is not helping. Too much oral NSAIDS can lead to gastritis, kidney disease. Hence recommended her to try topical NSADIS Diclofenac gel.

## 2019-10-07 NOTE — TELEPHONE ENCOUNTER
Medication Appeal Initiation    We have initiated an appeal for the requested medication:  Medication: diclofenac (VOLTAREN) 1 % topical gel  Appeal Start Date:  10/7/2019  Insurance Company: [employer] - Phone [phone] Fax [phone]  Comments:  Appeal initiated with letter of medical necessity included and faxed to #[phone]

## 2019-10-11 NOTE — TELEPHONE ENCOUNTER
Called [facility] at [phone] to check status of this appeal/make sure they received it. Was on hold with no answer and left a voicemail asking for a call back.

## 2019-10-14 NOTE — TELEPHONE ENCOUNTER
MEDICATION APPEAL APPROVED    Medication: diclofenac (VOLTAREN) 1 % topical gel  Authorization Effective Date: 10/9/2019  Authorization Expiration Date: 10/9/2020  Approved Dose/Quantity: 100g  Reference #: [id number]   Insurance Company: [facility] - Phone [phone] Fax [phone]  Which Pharmacy is filling the prescription (Not needed for infusion/clinic administered): [facility] [place] - [address]    Received voicemail from rep at [facility] Appeals stating that this has been approved for one year. Will document approval letter once received.  **Instructed pharmacy to notify patient when script is ready to /ship.**

## 2019-10-23 ENCOUNTER — OFFICE VISIT (OUTPATIENT)
Dept: INTERNAL MEDICINE | Facility: CLINIC | Age: 54
End: 2019-10-23
Payer: COMMERCIAL

## 2019-10-23 VITALS
OXYGEN SATURATION: 100 % | BODY MASS INDEX: 30.29 KG/M2 | TEMPERATURE: 98.2 F | WEIGHT: 176.44 LBS | RESPIRATION RATE: 17 BRPM | SYSTOLIC BLOOD PRESSURE: 138 MMHG | DIASTOLIC BLOOD PRESSURE: 72 MMHG | HEART RATE: 93 BPM

## 2019-10-23 DIAGNOSIS — Z23 NEED FOR PROPHYLACTIC VACCINATION AND INOCULATION AGAINST INFLUENZA: ICD-10-CM

## 2019-10-23 DIAGNOSIS — R20.2 NUMBNESS AND TINGLING OF BOTH LEGS BELOW KNEES: Primary | ICD-10-CM

## 2019-10-23 DIAGNOSIS — R60.0 BILATERAL LOWER EXTREMITY EDEMA: ICD-10-CM

## 2019-10-23 DIAGNOSIS — R20.0 NUMBNESS AND TINGLING OF BOTH LEGS BELOW KNEES: Primary | ICD-10-CM

## 2019-10-23 DIAGNOSIS — Z23 NEED FOR VACCINATION: ICD-10-CM

## 2019-10-23 DIAGNOSIS — M48.061 SPINAL STENOSIS OF LUMBAR REGION WITHOUT NEUROGENIC CLAUDICATION: ICD-10-CM

## 2019-10-23 LAB
ALBUMIN SERPL-MCNC: 3.4 G/DL (ref 3.4–5)
ALP SERPL-CCNC: 96 U/L (ref 40–150)
ALT SERPL W P-5'-P-CCNC: 35 U/L (ref 0–50)
ANION GAP SERPL CALCULATED.3IONS-SCNC: 5 MMOL/L (ref 3–14)
AST SERPL W P-5'-P-CCNC: 22 U/L (ref 0–45)
BILIRUB SERPL-MCNC: 0.3 MG/DL (ref 0.2–1.3)
BUN SERPL-MCNC: 15 MG/DL (ref 7–30)
CALCIUM SERPL-MCNC: 8.6 MG/DL (ref 8.5–10.1)
CHLORIDE SERPL-SCNC: 106 MMOL/L (ref 94–109)
CO2 SERPL-SCNC: 30 MMOL/L (ref 20–32)
CREAT SERPL-MCNC: 0.77 MG/DL (ref 0.52–1.04)
GFR SERPL CREATININE-BSD FRML MDRD: 89 ML/MIN/{1.73_M2}
GLUCOSE SERPL-MCNC: 92 MG/DL (ref 70–99)
POTASSIUM SERPL-SCNC: 3.7 MMOL/L (ref 3.4–5.3)
PROT SERPL-MCNC: 7.1 G/DL (ref 6.8–8.8)
SODIUM SERPL-SCNC: 141 MMOL/L (ref 133–144)
TSH SERPL DL<=0.005 MIU/L-ACNC: 1.02 MU/L (ref 0.4–4)
VIT B12 SERPL-MCNC: 655 PG/ML (ref 193–986)

## 2019-10-23 PROCEDURE — 90471 IMMUNIZATION ADMIN: CPT | Performed by: INTERNAL MEDICINE

## 2019-10-23 PROCEDURE — 80053 COMPREHEN METABOLIC PANEL: CPT | Performed by: INTERNAL MEDICINE

## 2019-10-23 PROCEDURE — 82306 VITAMIN D 25 HYDROXY: CPT | Performed by: INTERNAL MEDICINE

## 2019-10-23 PROCEDURE — 90715 TDAP VACCINE 7 YRS/> IM: CPT | Performed by: INTERNAL MEDICINE

## 2019-10-23 PROCEDURE — 36415 COLL VENOUS BLD VENIPUNCTURE: CPT | Performed by: INTERNAL MEDICINE

## 2019-10-23 PROCEDURE — 99214 OFFICE O/P EST MOD 30 MIN: CPT | Mod: 25 | Performed by: INTERNAL MEDICINE

## 2019-10-23 PROCEDURE — 84443 ASSAY THYROID STIM HORMONE: CPT | Performed by: INTERNAL MEDICINE

## 2019-10-23 PROCEDURE — 82607 VITAMIN B-12: CPT | Performed by: INTERNAL MEDICINE

## 2019-10-23 PROCEDURE — 90472 IMMUNIZATION ADMIN EACH ADD: CPT | Performed by: INTERNAL MEDICINE

## 2019-10-23 PROCEDURE — 90682 RIV4 VACC RECOMBINANT DNA IM: CPT | Performed by: INTERNAL MEDICINE

## 2019-10-23 RX ORDER — FUROSEMIDE 20 MG
20 TABLET ORAL DAILY
Qty: 30 TABLET | Refills: 3 | Status: SHIPPED | OUTPATIENT
Start: 2019-10-23 | End: 2020-03-25

## 2019-10-23 ASSESSMENT — PAIN SCALES - GENERAL: PAINLEVEL: MODERATE PAIN (5)

## 2019-10-23 NOTE — NURSING NOTE
Prior to injection verified patient identity using patient's name and date of birth.   Patient instructed to remain in clinic for 20 minutes afterwards and to report any adverse reaction to me immediately.  Elsie Mi, [facility]

## 2019-10-23 NOTE — PROGRESS NOTES
Subjective     Zina Harrell is a 53 year old female who presents to clinic today for the following health issues:    HPI   Chief Complaint   Patient presents with     Results     from EMG       Stress with her father living with her, now getting help with paperwork to get him into a nursing home.      Had EMG on her legs, didn't show a neuropathy. It was painful, had to use a short needle.  Legs continue to swell up on her.      Both legs are swollen today.  Weight is up 10 pounds since June as well.      Main pain is above the toes, on the top of the feet.      Saw neurosurgery, EMG, rheumatology.  Does have bilateral foraminal stenosis of L5-S1.    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 colp 8/2015 WNL     Current Outpatient Medications   Medication     cetirizine (ZYRTEC) 10 MG tablet     citalopram (CELEXA) 10 MG tablet     FLOVENT  MCG/ACT inhaler     furosemide (LASIX) 20 MG tablet     multivitamin, therapeutic with minerals (MULTI-VITAMIN) TABS tablet     omega-3 acid ethyl esters (LOVAZA) 1 g capsule     VENTOLIN  (90 Base) MCG/ACT inhaler     No current facility-administered medications for this visit.      Social History     Tobacco Use     Smoking status: Current Every Day Smoker     Packs/day: 0.50     Smokeless tobacco: Never Used     Tobacco comment: smoked since age 20   Substance Use Topics     Alcohol use: Yes     Comment: rarely     Drug use: No     Review of Systems  Constitutional-No fevers, chills, or weight changes..  Cardiac-No chest pain or palpitations.  Respiratory-No cough, sob, or hemoptysis.  GI-No nausea, vomitting, diarrhea, constipation, or blood in the stool.  Musculoskeletal-chronic leg swelling and pain,  Back pains.      Physical Exam  /72   Pulse 93   Temp 98.2  F (36.8  C) (Tympanic)   Resp 17   Wt 80 kg (176 lb 7 oz)   LMP 03/14/2016  (Within Months)   SpO2 100%   BMI 30.29 kg/m    General Appearance-healthy, alert, no distress  Cardiac-regular rate and rhythm  with normal S1, S2 ; no murmur, rub or gallops  Lungs-clear to auscultation  Extremities-2+ pitting edema in both legs.      ASSESSMENT:  53-year-old woman whose had issues with an spinal stenosis.  She had an injection in the summer, she had an MRI which shows some spinal stenosis and severe foraminal stenosis at L5-S1 level.  She may need to go back to see neurosurgery in the future.  She has trouble even when she lays on her stomach as she gets more pain if she had all arches her back.    She is had numbness and tingling and pain above her big toe on both feet and going up her legs we did do an EMG to see if there is any radicular or neuropathy findings.  EMG was negative but she continues to have some lower extremity symptoms she does have lower extremity edema which they thought may have affected her EMG.  In reviewing her weight is up 10 pounds in the last 3 months.  I am going to treat her with some Lasix 20 mg a day.  I am also going to check her labs including thyroid, kidney liver test today.    In reviewing her MRI think she may need some sort of surgical procedure on her back as this appears quite severe but will see how she does with some chiropractic therapy that is helped her in the past and follow-up with me in the future.      Electronically signed by Lebron Childers MD

## 2019-10-24 ENCOUNTER — TELEPHONE (OUTPATIENT)
Dept: INTERNAL MEDICINE | Facility: CLINIC | Age: 54
End: 2019-10-24

## 2019-10-24 LAB — DEPRECATED CALCIDIOL+CALCIFEROL SERPL-MC: 58 UG/L (ref 20–75)

## 2019-10-24 NOTE — TELEPHONE ENCOUNTER
----- Message from Lebron Childers MD sent at 10/24/2019  1:41 PM CDT -----  Please let her know her labs were all normal, or ok.

## 2020-01-21 ENCOUNTER — OFFICE VISIT (OUTPATIENT)
Dept: INTERNAL MEDICINE | Facility: CLINIC | Age: 55
End: 2020-01-21
Payer: COMMERCIAL

## 2020-01-21 VITALS
DIASTOLIC BLOOD PRESSURE: 84 MMHG | HEIGHT: 64 IN | BODY MASS INDEX: 31.07 KG/M2 | RESPIRATION RATE: 16 BRPM | SYSTOLIC BLOOD PRESSURE: 128 MMHG | TEMPERATURE: 97.6 F | OXYGEN SATURATION: 99 % | HEART RATE: 100 BPM | WEIGHT: 182 LBS

## 2020-01-21 DIAGNOSIS — K08.9 TOOTH DISORDER: Primary | ICD-10-CM

## 2020-01-21 DIAGNOSIS — Z91.89 POOR DENTAL HYGIENE: ICD-10-CM

## 2020-01-21 PROCEDURE — 99213 OFFICE O/P EST LOW 20 MIN: CPT | Performed by: INTERNAL MEDICINE

## 2020-01-21 ASSESSMENT — MIFFLIN-ST. JEOR: SCORE: 1420.55

## 2020-01-21 ASSESSMENT — PAIN SCALES - GENERAL: PAINLEVEL: MILD PAIN (3)

## 2020-01-21 NOTE — PROGRESS NOTES
"Subjective     Zina Harrell is a 54 year old female who presents to clinic today for the following health issues:    HPI   Chief Complaint   Patient presents with     Dental Problem     would like antibiotic      Her teeth are giving her problems, worried about infection there.      She has lost multiple teeth and had them break off.     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 colp 8/2015 WNL     Current Outpatient Medications   Medication     cetirizine (ZYRTEC) 10 MG tablet     citalopram (CELEXA) 10 MG tablet     FLOVENT  MCG/ACT inhaler     furosemide (LASIX) 20 MG tablet     multivitamin, therapeutic with minerals (MULTI-VITAMIN) TABS tablet     omega-3 acid ethyl esters (LOVAZA) 1 g capsule     VENTOLIN  (90 Base) MCG/ACT inhaler     No current facility-administered medications for this visit.      Physical Exam  /84   Pulse 100   Temp 97.6  F (36.4  C) (Temporal)   Resp 16   Ht 1.626 m (5' 4\")   Wt 82.6 kg (182 lb)   LMP 03/14/2016 (Within Months)   SpO2 99%   BMI 31.24 kg/m    General Appearance-healthy, alert, no distress  Her bottom teeth are in better shape than the upper teeth.  She does have a broken off floor right molar, her upper teeth she has 4-5 broken teeth, mild erythema around them.  No obvious abscess seen.      ASSESSMENT:  Patient here who has multiple broken teeth she did have some dry mouth, prednisone use.  She smokes.  Possibly other causes.  I do not think she needs a antibiotic today.  Her vital signs are stable.  Her labs in October were all stable we reviewed these no sign of infection.  I think she does need to get her teeth pulled with a dentist this will treat her pain and other symptoms.  Obviously the long-term poor dentition and broken teeth will cause high risk for infection throughout her body.    Electronically signed by Lebron " Alvarado WATERS

## 2020-02-22 DIAGNOSIS — F43.23 ADJUSTMENT DISORDER WITH MIXED ANXIETY AND DEPRESSED MOOD: ICD-10-CM

## 2020-02-26 RX ORDER — CITALOPRAM HYDROBROMIDE 10 MG/1
10 TABLET ORAL DAILY
Qty: 30 TABLET | Refills: 4 | Status: SHIPPED | OUTPATIENT
Start: 2020-02-26 | End: 2020-08-18

## 2020-02-26 NOTE — TELEPHONE ENCOUNTER
"citalopram  Last Written Prescription Date:  9/25/2019  Last Fill Quantity: 30,  # refills: 3   Last office visit: 1/21/2020 with prescribing provider:      Future Office Visit:      Requested Prescriptions   Pending Prescriptions Disp Refills     citalopram (CELEXA) 10 MG tablet [Pharmacy Med Name: CITALOPRAM HBR 10 MG TABLET 10 TAB] 30 tablet 3     Sig: TAKE 1 TABLET (10 MG) BY MOUTH DAILY       SSRIs Protocol Failed - 2/22/2020  1:08 PM        Failed - PHQ-9 score less than 5 in past 6 months     Please review last PHQ-9 score.   PHQ 9/30/2016 3/15/2019 5/3/2019   PHQ-9 Total Score - 9 9   Q9: Thoughts of better off dead/self-harm past 2 weeks Not at all Not at all Not at all           Passed - Medication is active on med list        Passed - Patient is age 18 or older        Passed - No active pregnancy on record        Passed - No positive pregnancy test in last 12 months        Passed - Recent (6 mo) or future (30 days) visit within the authorizing provider's specialty     Patient had office visit in the last 6 months or has a visit in the next 30 days with authorizing provider or within the authorizing provider's specialty.  See \"Patient Info\" tab in inbasket, or \"Choose Columns\" in Meds & Orders section of the refill encounter.              Imani Stanton RN on 2/26/2020 at 1:13 PM    "

## 2020-02-26 NOTE — TELEPHONE ENCOUNTER
Routing refill request to provider for review/approval because:  Unable to fill per RN protocol due to PHQ-9 >5.  Imani Statnon RN

## 2020-03-24 DIAGNOSIS — J45.20 INTERMITTENT ASTHMA, WELL CONTROLLED: ICD-10-CM

## 2020-03-24 DIAGNOSIS — R60.0 BILATERAL LOWER EXTREMITY EDEMA: ICD-10-CM

## 2020-03-25 RX ORDER — DEXAMETHASONE 4 MG/1
TABLET ORAL
Qty: 36 G | Refills: 0 | Status: SHIPPED | OUTPATIENT
Start: 2020-03-25 | End: 2020-08-18

## 2020-03-25 RX ORDER — FUROSEMIDE 20 MG
TABLET ORAL
Qty: 30 TABLET | Refills: 3 | Status: SHIPPED | OUTPATIENT
Start: 2020-03-25 | End: 2020-08-18

## 2020-03-25 NOTE — TELEPHONE ENCOUNTER
"Requested Prescriptions   Pending Prescriptions Disp Refills     furosemide (LASIX) 20 MG tablet [Pharmacy Med Name: FUROSEMIDE 20 MG TAB 20 TAB] 30 tablet 3     Sig: TAKE ONE TABLET (20 MG) BY MOUTH DAILY   Last Written Prescription Date:  10/23/19  Last Fill Quantity: 30,  # refills: 3   Last office visit: 1/21/2020 with prescribing provider:  10/23/19   Future Office Visit:        Diuretics (Including Combos) Protocol Passed - 3/24/2020  7:22 PM        Passed - Blood pressure under 140/90 in past 12 months     BP Readings from Last 3 Encounters:   01/21/20 128/84   10/23/19 138/72   09/20/19 132/76                 Passed - Recent (12 mo) or future (30 days) visit within the authorizing provider's specialty     Patient has had an office visit with the authorizing provider or a provider within the authorizing providers department within the previous 12 mos or has a future within next 30 days. See \"Patient Info\" tab in inbasket, or \"Choose Columns\" in Meds & Orders section of the refill encounter.              Passed - Medication is active on med list        Passed - Patient is age 18 or older        Passed - No active pregancy on record        Passed - Normal serum creatinine on file in past 12 months     Recent Labs   Lab Test 10/23/19  1012   CR 0.77              Passed - Normal serum potassium on file in past 12 months     Recent Labs   Lab Test 10/23/19  1012   POTASSIUM 3.7                    Passed - Normal serum sodium on file in past 12 months     Recent Labs   Lab Test 10/23/19  1012                 Passed - No positive pregnancy test in past 12 months           FLOVENT  MCG/ACT inhaler [Pharmacy Med Name: FLOVENT  MCG INHALER 110 AERO] 36 g 2     Sig: INHALE 2 PUFFS BY MOUTH 2 TIMES DAILY   Last Written Prescription Date:  4/8/19  Last Fill Quantity: 36g,  # refills: 2   Last office visit: 1/21/2020 with prescribing provider:  10/23/19   Future Office Visit:        Inhaled Steroids " "Protocol Failed - 3/24/2020  7:22 PM        Failed - Asthma control assessment score within normal limits in last 6 months     Please review ACT score.   ACT Total Scores 5/7/2018 3/15/2019 9/20/2019   ACT TOTAL SCORE - - -   ASTHMA ER VISITS - - -   ASTHMA HOSPITALIZATIONS - - -   ACT TOTAL SCORE (Goal Greater than or Equal to 20) 20 21 22   In the past 12 months, how many times did you visit the emergency room for your asthma without being admitted to the hospital? 0 0 0   In the past 12 months, how many times were you hospitalized overnight because of your asthma? 0 0 0             Passed - Patient is age 12 or older        Passed - Medication is active on med list        Passed - Recent (6 mo) or future (30 days) visit within the authorizing provider's specialty     Patient had office visit in the last 6 months or has a visit in the next 30 days with authorizing provider or within the authorizing provider's specialty.  See \"Patient Info\" tab in inbasket, or \"Choose Columns\" in Meds & Orders section of the refill encounter.                 "

## 2020-03-25 NOTE — TELEPHONE ENCOUNTER
Lasix:  Rx refilled per RN protocol.    Flovent  Rx refilled per RN protocol.  1 x  ZEYAD DianaN, RN

## 2020-08-17 DIAGNOSIS — J45.20 INTERMITTENT ASTHMA, WELL CONTROLLED: ICD-10-CM

## 2020-08-17 DIAGNOSIS — R60.0 BILATERAL LOWER EXTREMITY EDEMA: ICD-10-CM

## 2020-08-17 DIAGNOSIS — J30.2 CHRONIC SEASONAL ALLERGIC RHINITIS: ICD-10-CM

## 2020-08-17 DIAGNOSIS — F43.23 ADJUSTMENT DISORDER WITH MIXED ANXIETY AND DEPRESSED MOOD: ICD-10-CM

## 2020-08-18 RX ORDER — FUROSEMIDE 20 MG
TABLET ORAL
Qty: 30 TABLET | Refills: 3 | Status: SHIPPED | OUTPATIENT
Start: 2020-08-18 | End: 2021-01-04

## 2020-08-18 RX ORDER — CITALOPRAM HYDROBROMIDE 10 MG/1
10 TABLET ORAL DAILY
Qty: 30 TABLET | Refills: 0 | Status: SHIPPED | OUTPATIENT
Start: 2020-08-18 | End: 2020-09-25

## 2020-08-18 RX ORDER — CETIRIZINE HYDROCHLORIDE 10 MG/1
TABLET ORAL
Qty: 30 TABLET | Refills: 3 | Status: SHIPPED | OUTPATIENT
Start: 2020-08-18 | End: 2021-01-04

## 2020-08-18 RX ORDER — DEXAMETHASONE 4 MG/1
TABLET ORAL
Qty: 36 G | Refills: 0 | Status: SHIPPED | OUTPATIENT
Start: 2020-08-18 | End: 2020-11-28

## 2020-08-18 NOTE — TELEPHONE ENCOUNTER
Pending Prescriptions Disp Refills     FLOVENT  MCG/ACT inhaler [Pharmacy Med Name: FLOVENT  MCG INHALER 110 AERO] 36 g 0     Sig: INHALE 2 PUFFS BY MOUTH 2 TIMES DAILY   Routing refill request to provider for review/approval because:  ACT not current  ACT Total Scores 5/7/2018 3/15/2019 9/20/2019   ACT TOTAL SCORE - - -   ASTHMA ER VISITS - - -   ASTHMA HOSPITALIZATIONS - - -   ACT TOTAL SCORE (Goal Greater than or Equal to 20) 20 21 22   In the past 12 months, how many times did you visit the emergency room for your asthma without being admitted to the hospital? 0 0 0   In the past 12 months, how many times were you hospitalized overnight because of your asthma? 0 0 0   T'd up 1 month for provider review.        citalopram (CELEXA) 10 MG tablet [Pharmacy Med Name: CITALOPRAM HBR 10 MG TABLET 10 TAB] 30 tablet 4    Sig: TAKE 1 TABLET (10 MG) BY MOUTH DAILY   Routing refill request to provider for review/approval because:  PHQ9 not current  PHQ 5/3/2019   PHQ-9 Total Score 9   Q9: Thoughts of better off dead/self-harm past 2 weeks Not at all   T'd up 1 month for provider review.      Kathleen Sebastian RN

## 2020-08-18 NOTE — TELEPHONE ENCOUNTER
"Requested Prescriptions   Pending Prescriptions Disp Refills     FLOVENT  MCG/ACT inhaler [Pharmacy Med Name: FLOVENT  MCG INHALER 110 AERO] 36 g 0     Sig: INHALE 2 PUFFS BY MOUTH 2 TIMES DAILY   Last Written Prescription Date:  3/25/2020  Last Fill Quantity: 36g,  # refills: 0   Last office visit: 1/21/2020 with prescribing provider:     Future Office Visit:        Inhaled Steroids Protocol Failed - 8/17/2020 10:40 AM        Failed - Asthma control assessment score within normal limits in last 6 months     Please review ACT score.   ACT Total Scores 5/7/2018 3/15/2019 9/20/2019   ACT TOTAL SCORE - - -   ASTHMA ER VISITS - - -   ASTHMA HOSPITALIZATIONS - - -   ACT TOTAL SCORE (Goal Greater than or Equal to 20) 20 21 22   In the past 12 months, how many times did you visit the emergency room for your asthma without being admitted to the hospital? 0 0 0   In the past 12 months, how many times were you hospitalized overnight because of your asthma? 0 0 0             Failed - Recent (6 mo) or future (30 days) visit within the authorizing provider's specialty     Patient had office visit in the last 6 months or has a visit in the next 30 days with authorizing provider or within the authorizing provider's specialty.  See \"Patient Info\" tab in inbasket, or \"Choose Columns\" in Meds & Orders section of the refill encounter.            Passed - Patient is age 12 or older        Passed - Medication is active on med list      Routing refill request to provider for review/approval          citalopram (CELEXA) 10 MG tablet [Pharmacy Med Name: CITALOPRAM HBR 10 MG TABLET 10 TAB] 30 tablet 4     Sig: TAKE 1 TABLET (10 MG) BY MOUTH DAILY   Last Written Prescription Date:  2/26/2020  Last Fill Quantity: 30,  # refills: 4   Last office visit: 1/21/2020 with prescribing provider:     Future Office Visit:        SSRIs Protocol Failed - 8/17/2020 10:40 AM        Failed - PHQ-9 score less than 5 in past 6 months     Please " "review last PHQ-9 score.   PHQ-9 score:    PHQ 5/3/2019   PHQ-9 Total Score 9   Q9: Thoughts of better off dead/self-harm past 2 weeks Not at all             Failed - Recent (6 mo) or future (30 days) visit within the authorizing provider's specialty     Patient had office visit in the last 6 months or has a visit in the next 30 days with authorizing provider or within the authorizing provider's specialty.  See \"Patient Info\" tab in inbasket, or \"Choose Columns\" in Meds & Orders section of the refill encounter.            Passed - Medication is active on med list        Passed - Patient is age 18 or older        Passed - No active pregnancy on record        Passed - No positive pregnancy test in last 12 months      Routing refill request to provider for review/approval        furosemide (LASIX) 20 MG tablet [Pharmacy Med Name: FUROSEMIDE 20 MG TABLET 20 TAB] 30 tablet 3     Sig: TAKE ONE TABLET (20 MG) BY MOUTH DAILY   Last Written Prescription Date:  3/25/2020  Last Fill Quantity: 30,  # refills: 3   Last office visit: 1/21/2020 with prescribing provider:     Future Office Visit:        Diuretics (Including Combos) Protocol Passed - 8/17/2020 10:40 AM        Passed - Blood pressure under 140/90 in past 12 months     BP Readings from Last 3 Encounters:   01/21/20 128/84   10/23/19 138/72   09/20/19 132/76           Passed - Recent (12 mo) or future (30 days) visit within the authorizing provider's specialty     Patient has had an office visit with the authorizing provider or a provider within the authorizing providers department within the previous 12 mos or has a future within next 30 days. See \"Patient Info\" tab in inbasket, or \"Choose Columns\" in Meds & Orders section of the refill encounter.          Passed - Medication is active on med list        Passed - Patient is age 18 or older        Passed - No active pregancy on record        Passed - Normal serum creatinine on file in past 12 months     Recent Labs " "  Lab Test 10/23/19  1012   CR 0.77            Passed - Normal serum potassium on file in past 12 months     Recent Labs   Lab Test 10/23/19  1012   POTASSIUM 3.7            Passed - Normal serum sodium on file in past 12 months     Recent Labs   Lab Test 10/23/19  1012                 Passed - No positive pregnancy test in past 12 months      Prescription approved per [facility] Refill Protocol.       cetirizine (ZYRTEC) 10 MG tablet [Pharmacy Med Name: CETIRIZINE HCL 10 MG TABLET 10 TAB] 30 tablet 5     Sig: TAKE ONE TABLET BY MOUTH EACH EVENING   Last Written Prescription Date:  7/15/2019  Last Fill Quantity: 30,  # refills: 5   Last office visit: 1/21/2020 with prescribing provider:     Future Office Visit:        Antihistamines Protocol Passed - 8/17/2020 10:40 AM        Passed - Patient is 3-64 years of age     Apply weight-based dosing for peds patients age 3 - 12 years of age.    Forward request to provider for patients under the age of 3 or over the age of 64.          Passed - Recent (12 mo) or future (30 days) visit within the authorizing provider's specialty     Patient has had an office visit with the authorizing provider or a provider within the authorizing providers department within the previous 12 mos or has a future within next 30 days. See \"Patient Info\" tab in inbasket, or \"Choose Columns\" in Meds & Orders section of the refill encounter.            Passed - Medication is active on med list         Prescription approved per [facility] Refill Protocol.  Kathleen Sebastian RN      "

## 2020-09-25 DIAGNOSIS — F43.23 ADJUSTMENT DISORDER WITH MIXED ANXIETY AND DEPRESSED MOOD: ICD-10-CM

## 2020-09-25 RX ORDER — CITALOPRAM HYDROBROMIDE 10 MG/1
10 TABLET ORAL DAILY
Qty: 30 TABLET | Refills: 0 | Status: SHIPPED | OUTPATIENT
Start: 2020-09-25 | End: 2020-10-21

## 2020-09-25 NOTE — TELEPHONE ENCOUNTER
"Routing refill request to provider for review/approval because:  PHQ9 score out of range  PHQ9 score not current  T'd up 1 month for provider review.      Requested Prescriptions   Pending Prescriptions Disp Refills     citalopram (CELEXA) 10 MG tablet [Pharmacy Med Name: CITALOPRAM HBR 10 MG TABLET 10 Tablet] 30 tablet 0     Sig: TAKE 1 TABLET (10 MG) BY MOUTH DAILY   Last Written Prescription Date:  8/18/2020  Last Fill Quantity: 30,  # refills: 0   Last office visit: 1/21/2020 with prescribing provider:     Future Office Visit:      SSRIs Protocol Failed - 9/25/2020  1:11 PM        Failed - PHQ-9 score less than 5 in past 6 months     Please review last PHQ-9 score.   PHQ-9 score:    PHQ 5/3/2019   PHQ-9 Total Score 9   Q9: Thoughts of better off dead/self-harm past 2 weeks Not at all           Failed - Recent (6 mo) or future (30 days) visit within the authorizing provider's specialty     Patient had office visit in the last 6 months or has a visit in the next 30 days with authorizing provider or within the authorizing provider's specialty.  See \"Patient Info\" tab in inbasket, or \"Choose Columns\" in Meds & Orders section of the refill encounter.            Passed - Medication is active on med list        Passed - Patient is age 18 or older        Passed - No active pregnancy on record        Passed - No positive pregnancy test in last 12 months         Kathleen Sebastian RN      "

## 2020-10-21 DIAGNOSIS — F43.23 ADJUSTMENT DISORDER WITH MIXED ANXIETY AND DEPRESSED MOOD: ICD-10-CM

## 2020-10-21 RX ORDER — CITALOPRAM HYDROBROMIDE 10 MG/1
10 TABLET ORAL DAILY
Qty: 30 TABLET | Refills: 0 | Status: SHIPPED | OUTPATIENT
Start: 2020-10-21 | End: 2020-11-28

## 2020-10-21 NOTE — TELEPHONE ENCOUNTER
"CELEXA 10 mg  Last Written Prescription Date:  9/25/20  Last Fill Quantity: 30,  # refills: 0   Last office visit: 10/3/19 with prescribing provider:  Dr. Childers    Future Office Visit:NONE--DUE FOR OFFICE VISIT  Requested Prescriptions   Pending Prescriptions Disp Refills     citalopram (CELEXA) 10 MG tablet [Pharmacy Med Name: CITALOPRAM HBR 10 MG TABLET 10 Tablet] 30 tablet 0     Sig: TAKE 1 TABLET (10 MG) BY MOUTH DAILY       SSRIs Protocol Failed - 10/21/2020 12:46 PM        Failed - PHQ-9 score less than 5 in past 6 months     Please review last PHQ-9 score.     PHQ 9/30/2016 3/15/2019 5/3/2019   PHQ-9 Total Score - 9 9   Q9: Thoughts of better off dead/self-harm past 2 weeks Not at all Not at all Not at all     ]        Failed - Recent (6 mo) or future (30 days) visit within the authorizing provider's specialty     Patient had office visit in the last 6 months or has a visit in the next 30 days with authorizing provider or within the authorizing provider's specialty.  See \"Patient Info\" tab in inbasket, or \"Choose Columns\" in Meds & Orders section of the refill encounter.            Passed - Medication is active on med list        Passed - Patient is age 18 or older        Passed - No active pregnancy on record        Passed - No positive pregnancy test in last 12 months         Routing refill request to provider for review/approval because:  Patient needs to be seen because it has been more than 1 year since last office visit.  PHQ9 score > 5  TRACIE Chowdary                      "

## 2020-11-27 DIAGNOSIS — J45.20 INTERMITTENT ASTHMA, WELL CONTROLLED: ICD-10-CM

## 2020-11-27 DIAGNOSIS — F43.23 ADJUSTMENT DISORDER WITH MIXED ANXIETY AND DEPRESSED MOOD: ICD-10-CM

## 2020-11-27 NOTE — TELEPHONE ENCOUNTER
Pending Prescriptions:                       Disp   Refills    citalopram (CELEXA) 10 MG tablet [Pharmacy*30 tab*0        Sig: TAKE ONE TABLET (10 MG) BY MOUTH DAILY    FLOVENT  MCG/ACT inhaler [Pharmacy *12 g   0        Sig: INHALE 2 PUFFS BY MOUTH 2 TIMES DAILY    Last Written Prescription Date:    Citalopram 10/21/2020 qty 30 with 0 refills  flovent 8/18/20 qty 36 with 0 refills  Last office visit: 1/21/2020 with prescribing provider:  Alvarado   Future Office Visit:        Routing refill request to provider for review/approval because:  Labs out of range:  act  Labs not current:  phq9    Suri Olmstead RN on 11/27/2020 at 12:40 PM

## 2020-11-28 RX ORDER — CITALOPRAM HYDROBROMIDE 10 MG/1
TABLET ORAL
Qty: 30 TABLET | Refills: 0 | Status: SHIPPED | OUTPATIENT
Start: 2020-11-28 | End: 2021-01-04

## 2020-11-28 RX ORDER — DEXAMETHASONE 4 MG/1
TABLET ORAL
Qty: 12 G | Refills: 0 | Status: SHIPPED | OUTPATIENT
Start: 2020-11-28 | End: 2021-01-04

## 2020-12-31 DIAGNOSIS — J30.2 CHRONIC SEASONAL ALLERGIC RHINITIS: ICD-10-CM

## 2020-12-31 DIAGNOSIS — F43.23 ADJUSTMENT DISORDER WITH MIXED ANXIETY AND DEPRESSED MOOD: ICD-10-CM

## 2020-12-31 DIAGNOSIS — R60.0 BILATERAL LOWER EXTREMITY EDEMA: ICD-10-CM

## 2020-12-31 DIAGNOSIS — J45.20 INTERMITTENT ASTHMA, WELL CONTROLLED: ICD-10-CM

## 2021-01-04 RX ORDER — CITALOPRAM HYDROBROMIDE 10 MG/1
TABLET ORAL
Qty: 30 TABLET | Refills: 1 | Status: SHIPPED | OUTPATIENT
Start: 2021-01-04 | End: 2021-02-10

## 2021-01-04 RX ORDER — DEXAMETHASONE 4 MG/1
TABLET ORAL
Qty: 12 G | Refills: 1 | Status: SHIPPED | OUTPATIENT
Start: 2021-01-04 | End: 2021-02-10

## 2021-01-04 RX ORDER — FUROSEMIDE 20 MG
TABLET ORAL
Qty: 30 TABLET | Refills: 1 | Status: SHIPPED | OUTPATIENT
Start: 2021-01-04 | End: 2021-02-10

## 2021-01-04 RX ORDER — CETIRIZINE HYDROCHLORIDE 10 MG/1
TABLET ORAL
Qty: 30 TABLET | Refills: 1 | Status: SHIPPED | OUTPATIENT
Start: 2021-01-04 | End: 2021-02-10

## 2021-01-04 NOTE — TELEPHONE ENCOUNTER
Routing refill request to provider for review/approval because:  Labs not current:  Creatinine, Potassium. Sodium  ACT overdue  PHQ-9 overdue and >4 (score of 9)    Erin Nguyen BSN, RN  [facility]

## 2021-02-10 ENCOUNTER — OFFICE VISIT (OUTPATIENT)
Dept: INTERNAL MEDICINE | Facility: CLINIC | Age: 56
End: 2021-02-10
Payer: COMMERCIAL

## 2021-02-10 ENCOUNTER — TELEPHONE (OUTPATIENT)
Dept: INTERNAL MEDICINE | Facility: CLINIC | Age: 56
End: 2021-02-10

## 2021-02-10 VITALS
OXYGEN SATURATION: 98 % | DIASTOLIC BLOOD PRESSURE: 76 MMHG | HEIGHT: 64 IN | BODY MASS INDEX: 30.39 KG/M2 | TEMPERATURE: 99.3 F | HEART RATE: 104 BPM | WEIGHT: 178 LBS | RESPIRATION RATE: 16 BRPM | SYSTOLIC BLOOD PRESSURE: 118 MMHG

## 2021-02-10 DIAGNOSIS — Z00.00 ENCOUNTER FOR ROUTINE ADULT HEALTH EXAMINATION WITHOUT ABNORMAL FINDINGS: Primary | ICD-10-CM

## 2021-02-10 DIAGNOSIS — J45.20 INTERMITTENT ASTHMA, WELL CONTROLLED: ICD-10-CM

## 2021-02-10 DIAGNOSIS — R60.0 BILATERAL LOWER EXTREMITY EDEMA: ICD-10-CM

## 2021-02-10 DIAGNOSIS — F43.23 ADJUSTMENT DISORDER WITH MIXED ANXIETY AND DEPRESSED MOOD: ICD-10-CM

## 2021-02-10 DIAGNOSIS — J30.2 CHRONIC SEASONAL ALLERGIC RHINITIS: ICD-10-CM

## 2021-02-10 LAB
ALBUMIN SERPL-MCNC: 3.4 G/DL (ref 3.4–5)
ALP SERPL-CCNC: 86 U/L (ref 40–150)
ALT SERPL W P-5'-P-CCNC: 34 U/L (ref 0–50)
ANION GAP SERPL CALCULATED.3IONS-SCNC: 6 MMOL/L (ref 3–14)
AST SERPL W P-5'-P-CCNC: 25 U/L (ref 0–45)
BILIRUB SERPL-MCNC: 0.3 MG/DL (ref 0.2–1.3)
BUN SERPL-MCNC: 14 MG/DL (ref 7–30)
CALCIUM SERPL-MCNC: 10.1 MG/DL (ref 8.5–10.1)
CHLORIDE SERPL-SCNC: 107 MMOL/L (ref 94–109)
CO2 SERPL-SCNC: 27 MMOL/L (ref 20–32)
CREAT SERPL-MCNC: 0.8 MG/DL (ref 0.52–1.04)
ERYTHROCYTE [DISTWIDTH] IN BLOOD BY AUTOMATED COUNT: 13.2 % (ref 10–15)
GFR SERPL CREATININE-BSD FRML MDRD: 85 ML/MIN/{1.73_M2}
GLUCOSE SERPL-MCNC: 107 MG/DL (ref 70–99)
HCT VFR BLD AUTO: 41.5 % (ref 35–47)
HGB BLD-MCNC: 14.1 G/DL (ref 11.7–15.7)
MCH RBC QN AUTO: 32.4 PG (ref 26.5–33)
MCHC RBC AUTO-ENTMCNC: 34 G/DL (ref 31.5–36.5)
MCV RBC AUTO: 95 FL (ref 78–100)
PLATELET # BLD AUTO: 312 10E9/L (ref 150–450)
POTASSIUM SERPL-SCNC: 3.5 MMOL/L (ref 3.4–5.3)
PROT SERPL-MCNC: 7.4 G/DL (ref 6.8–8.8)
RBC # BLD AUTO: 4.35 10E12/L (ref 3.8–5.2)
SODIUM SERPL-SCNC: 140 MMOL/L (ref 133–144)
WBC # BLD AUTO: 6.9 10E9/L (ref 4–11)

## 2021-02-10 PROCEDURE — 87624 HPV HI-RISK TYP POOLED RSLT: CPT | Performed by: INTERNAL MEDICINE

## 2021-02-10 PROCEDURE — 36415 COLL VENOUS BLD VENIPUNCTURE: CPT | Performed by: INTERNAL MEDICINE

## 2021-02-10 PROCEDURE — G0145 SCR C/V CYTO,THINLAYER,RESCR: HCPCS | Performed by: INTERNAL MEDICINE

## 2021-02-10 PROCEDURE — 85027 COMPLETE CBC AUTOMATED: CPT | Performed by: INTERNAL MEDICINE

## 2021-02-10 PROCEDURE — 80053 COMPREHEN METABOLIC PANEL: CPT | Performed by: INTERNAL MEDICINE

## 2021-02-10 PROCEDURE — 99396 PREV VISIT EST AGE 40-64: CPT | Performed by: INTERNAL MEDICINE

## 2021-02-10 RX ORDER — FUROSEMIDE 20 MG
TABLET ORAL
Qty: 90 TABLET | Refills: 3 | Status: SHIPPED | OUTPATIENT
Start: 2021-02-10 | End: 2022-02-01

## 2021-02-10 RX ORDER — SPIRONOLACTONE 25 MG
TABLET ORAL DAILY
COMMUNITY
End: 2022-02-01

## 2021-02-10 RX ORDER — DEXAMETHASONE 4 MG/1
TABLET ORAL
Qty: 12 G | Refills: 3 | Status: SHIPPED | OUTPATIENT
Start: 2021-02-10 | End: 2021-08-13

## 2021-02-10 RX ORDER — CITALOPRAM HYDROBROMIDE 20 MG/1
20 TABLET ORAL DAILY
Qty: 90 TABLET | Refills: 3 | Status: SHIPPED | OUTPATIENT
Start: 2021-02-10 | End: 2022-02-01

## 2021-02-10 RX ORDER — CETIRIZINE HYDROCHLORIDE 10 MG/1
TABLET ORAL
Qty: 90 TABLET | Refills: 3 | Status: SHIPPED | OUTPATIENT
Start: 2021-02-10 | End: 2022-02-01

## 2021-02-10 ASSESSMENT — ENCOUNTER SYMPTOMS
HEMATURIA: 0
HEARTBURN: 1
PARESTHESIAS: 0
SORE THROAT: 0
DIZZINESS: 0
CHILLS: 0
JOINT SWELLING: 0
SHORTNESS OF BREATH: 0
NAUSEA: 0
ABDOMINAL PAIN: 0
DIARRHEA: 0
COUGH: 1
ARTHRALGIAS: 0
MYALGIAS: 0
FEVER: 0
PALPITATIONS: 0
FREQUENCY: 0
HEADACHES: 1
HEMATOCHEZIA: 0
NERVOUS/ANXIOUS: 0
DYSURIA: 0
BREAST MASS: 0
EYE PAIN: 0
CONSTIPATION: 1
WEAKNESS: 1

## 2021-02-10 ASSESSMENT — PATIENT HEALTH QUESTIONNAIRE - PHQ9
SUM OF ALL RESPONSES TO PHQ QUESTIONS 1-9: 12
SUM OF ALL RESPONSES TO PHQ QUESTIONS 1-9: 12

## 2021-02-10 ASSESSMENT — PAIN SCALES - GENERAL: PAINLEVEL: NO PAIN (0)

## 2021-02-10 ASSESSMENT — MIFFLIN-ST. JEOR: SCORE: 1397.4

## 2021-02-10 NOTE — PROGRESS NOTES
"   SUBJECTIVE:   CC: Zina Harrell is an 55 year old woman who presents for preventive health visit.     Patient has been advised of split billing requirements and indicates understanding: Yes  Healthy Habits:     Getting at least 3 servings of Calcium per day:  NO    Bi-annual eye exam:  NO    Dental care twice a year:  NO    Sleep apnea or symptoms of sleep apnea:  None    Diet:  Regular (no restrictions)    Duration of exercise:  N/A    Taking medications regularly:  Yes    Medication side effects:  None    PHQ-2 Total Score: 4    Additional concerns today:  No    Lives at her dad's place but he is in a home now.    Hasn't had covid, hasn't been sick.      Multiple teeth problems, needs pulled at some point.     Has had spinal stenosis and had injection June 2019, unclear if helped. Just a numbness.     Has inhalers for asthma, congestion stays down.  Smoking still 1/2 pack a week.    Ankles a little swelling but on lasix, pretty good.         Today's PHQ-2 Score:   PHQ-2 ( 1999 Pfizer) 2/10/2021   Q1: Little interest or pleasure in doing things 2   Q2: Feeling down, depressed or hopeless 2   PHQ-2 Score 4   Q1: Little interest or pleasure in doing things More than half the days   Q2: Feeling down, depressed or hopeless More than half the days   PHQ-2 Score 4   Getting more depressed and not getting things done, \"sabotages herself\" Feels in a rut.          Abuse: Current or Past (Physical, Sexual or Emotional) - No  Do you feel safe in your environment? Yes    Social History     Tobacco Use     Smoking status: Current Every Day Smoker     Packs/day: 0.50     Smokeless tobacco: Never Used     Tobacco comment: smoked since age 20   Substance Use Topics     Alcohol use: Yes     Comment: rarely     If you drink alcohol do you typically have >3 drinks per day or >7 drinks per week? No    Alcohol Use 2/10/2021   Prescreen: >3 drinks/day or >7 drinks/week? No   Prescreen: >3 drinks/day or >7 drinks/week? -     Any " "new diagnosis of family breast, ovarian, or bowel cancer?      Reviewed orders with patient.  Reviewed health maintenance and updated orders accordingly - Yes  Lab work is in process    Breast CA Risk Screening:  No flowsheet data found.        Pertinent mammograms are reviewed under the imaging tab.    History of abnormal Pap smear: NO - age 30- 65 PAP every 3 years recommended  PAP / HPV Latest Ref Rng & Units 11/27/2017 9/14/2016 8/6/2015   PAP - NIL NIL NIL   HPV 16 DNA NEG:Negative Negative Negative Negative   HPV 18 DNA NEG:Negative Negative Negative Negative   OTHER HR HPV NEG:Negative Negative Negative Negative     Reviewed and updated as needed this visit by clinical staff  Tobacco  Allergies  Meds              Reviewed and updated as needed this visit by Provider                  Review of Systems   Constitutional: Negative for chills and fever.   HENT: Positive for congestion. Negative for ear pain, hearing loss and sore throat.    Eyes: Negative for pain and visual disturbance.   Respiratory: Positive for cough. Negative for shortness of breath.    Cardiovascular: Negative for chest pain, palpitations and peripheral edema.   Gastrointestinal: Positive for constipation and heartburn. Negative for abdominal pain, diarrhea, hematochezia and nausea.   Breasts:  Negative for tenderness, breast mass and discharge.   Genitourinary: Positive for urgency. Negative for dysuria, frequency, genital sores, hematuria, pelvic pain, vaginal bleeding and vaginal discharge.   Musculoskeletal: Negative for arthralgias, joint swelling and myalgias.   Skin: Negative for rash.   Neurological: Positive for weakness and headaches. Negative for dizziness and paresthesias.   Psychiatric/Behavioral: Negative for mood changes. The patient is not nervous/anxious.           OBJECTIVE:   /76   Pulse 104   Temp 99.3  F (37.4  C) (Temporal)   Resp 16   Ht 1.626 m (5' 4\")   Wt 80.7 kg (178 lb)   LMP 03/14/2016 (Within " Months)   SpO2 98%   BMI 30.55 kg/m    Physical Exam  GENERAL: healthy, alert and no distress  EYES: Eyes grossly normal to inspection, PERRL and conjunctivae and sclerae normal  HENT: ear canals and TM's normal, nose and mouth without ulcers or lesions  NECK: no adenopathy, no asymmetry, masses, or scars and thyroid normal to palpation  RESP: lungs clear to auscultation - no rales, rhonchi or wheezes  CV: regular rate and rhythm, normal S1 S2, no S3 or S4, no murmur, click or rub, no peripheral edema and peripheral pulses strong  ABDOMEN: soft, nontender, no hepatosplenomegaly, no masses and bowel sounds normal   (female): normal female external genitalia, normal urethral meatus , vaginal mucosa pink, moist, well rugated, normal cervix, adnexae, and uterus without masses. and Robina Stanton present for pap   MS: no gross musculoskeletal defects noted, no edema  SKIN: no suspicious lesions or rashes  NEURO: Normal strength and tone, mentation intact and speech normal  PSYCH: mentation appears normal, affect normal/bright      ASSESSMENT/PLAN:       ICD-10-CM    1. Encounter for routine adult health examination without abnormal findings  Z00.00    2. Chronic seasonal allergic rhinitis  J30.2 cetirizine (ZYRTEC) 10 MG tablet   3. Asthma, intermittent controlled  J45.20 fluticasone (FLOVENT HFA) 110 MCG/ACT inhaler     CBC with platelets   4. Bilateral lower extremity edema  R60.0 furosemide (LASIX) 20 MG tablet     Comprehensive metabolic panel   5. Adjustment disorder with mixed anxiety and depressed mood  F43.23 citalopram (CELEXA) 20 MG tablet     Patient here for regular physical.  She is okay but having more depression we will increase her Celexa from 10-20.  I worry about her during the pandemic encouraged her to get up and find a new job so she has some interaction.    Pelvic exam and Pap smear is done.    Asthma stable refill her Flovent    We will check labs for CBC and comprehensive metabolic panel  "today.  For her leg numbness and pain we done an EMG, see neurosurgery had an injection.  She should follow-up for another visit if this continues.      Patient has been advised of split billing requirements and indicates understanding: Yes  COUNSELING:  Reviewed preventive health counseling, as reflected in patient instructions       Regular exercise       Healthy diet/nutrition    Estimated body mass index is 30.55 kg/m  as calculated from the following:    Height as of this encounter: 1.626 m (5' 4\").    Weight as of this encounter: 80.7 kg (178 lb).    Weight management plan: Discussed healthy diet and exercise guidelines    She reports that she has been smoking. She has been smoking about 0.50 packs per day. She has never used smokeless tobacco.  Tobacco Cessation Action Plan:   Information offered: Patient not interested at this time      Counseling Resources:  ATP IV Guidelines  Pooled Cohorts Equation Calculator  Breast Cancer Risk Calculator  BRCA-Related Cancer Risk Assessment: FHS-7 Tool  FRAX Risk Assessment  ICSI Preventive Guidelines  Dietary Guidelines for Americans, 2010  USDA's MyPlate  ASA Prophylaxis  Lung CA Screening    Lebron Childers MD  [facility]  "

## 2021-02-10 NOTE — TELEPHONE ENCOUNTER
----- Message from Lebron Childers MD sent at 2/10/2021  4:28 PM CST -----  Please call and let her know her labs are okay, continue her medications.

## 2021-02-11 ASSESSMENT — PATIENT HEALTH QUESTIONNAIRE - PHQ9: SUM OF ALL RESPONSES TO PHQ QUESTIONS 1-9: 12

## 2021-02-12 LAB
COPATH REPORT: NORMAL
PAP: NORMAL

## 2021-02-15 LAB
FINAL DIAGNOSIS: NORMAL
HPV HR 12 DNA CVX QL NAA+PROBE: NEGATIVE
HPV16 DNA SPEC QL NAA+PROBE: NEGATIVE
HPV18 DNA SPEC QL NAA+PROBE: NEGATIVE
SPECIMEN DESCRIPTION: NORMAL
SPECIMEN SOURCE CVX/VAG CYTO: NORMAL

## 2021-04-28 DIAGNOSIS — R06.02 SOB (SHORTNESS OF BREATH): ICD-10-CM

## 2021-04-28 DIAGNOSIS — J45.20 INTERMITTENT ASTHMA, WELL CONTROLLED: ICD-10-CM

## 2021-04-28 RX ORDER — ALBUTEROL SULFATE 90 UG/1
AEROSOL, METERED RESPIRATORY (INHALATION)
Qty: 18 G | Refills: 2 | Status: SHIPPED | OUTPATIENT
Start: 2021-04-28 | End: 2021-08-13

## 2021-04-28 NOTE — TELEPHONE ENCOUNTER
Routing refill request to provider for review/approval because:  ACT overdue    ZEYAD GloverN, RN  [facility]

## 2021-05-24 ENCOUNTER — VIRTUAL VISIT (OUTPATIENT)
Dept: INTERNAL MEDICINE | Facility: CLINIC | Age: 56
End: 2021-05-24
Payer: COMMERCIAL

## 2021-05-24 DIAGNOSIS — Z13.6 CARDIOVASCULAR SCREENING; LDL GOAL LESS THAN 160: ICD-10-CM

## 2021-05-24 DIAGNOSIS — Z71.85 VACCINE COUNSELING: ICD-10-CM

## 2021-05-24 DIAGNOSIS — Z12.31 VISIT FOR SCREENING MAMMOGRAM: Primary | ICD-10-CM

## 2021-05-24 PROCEDURE — 99213 OFFICE O/P EST LOW 20 MIN: CPT | Mod: 95 | Performed by: INTERNAL MEDICINE

## 2021-05-24 NOTE — PROGRESS NOTES
Zina is a 55 year old who is being evaluated via a billable telephone visit.      What phone number would you like to be contacted at? [phone]  How would you like to obtain your AVS?     Assessment & Plan     Visit for screening mammogram  Patient needs a screening mammogram we order this is been 2 years for her.  - *MA Screening Digital Bilateral; Future    Vaccine counseling  Counseled on the Covid vaccination, will help get her set up for the [place] vaccine here in our clinic.    CARDIOVASCULAR SCREENING; LDL GOAL LESS THAN 160  Cardiovascular screening we did not check lipids but she was not fasting before we will do fasting future lipid panel.  - Lipid panel reflex to direct LDL Fasting; Future                 Return in about 6 months (around 11/24/2021) for Routine Visit.    Lebron Childers MD  [facility]   Zina is a 55 year old who presents for the following health issues     HPI     Chief Complaint   Patient presents with     Telephone     questions about the J&J vaccine, mammogram order and lipids     She is ok. Has some questions. Needs a mammogram, ordered placed.  Wants to get the vaccine.        Would like to check her cholesterol, will do future fasting.     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 colp 8/2015 WNL     Current Outpatient Medications   Medication     albuterol (PROAIR HFA/PROVENTIL HFA/VENTOLIN HFA) 108 (90 Base) MCG/ACT inhaler     B Complex-C (VITAMIN B COMPLEX W/VITAMIN C) TABS tablet     cetirizine (ZYRTEC) 10 MG tablet     Cholecalciferol (VITAMIN D-3) 125 MCG (5000 UT) TABS     citalopram (CELEXA) 20 MG tablet     fluticasone (FLOVENT HFA) 110 MCG/ACT inhaler     furosemide (LASIX) 20 MG tablet     Lutein 20 MG CAPS     multivitamin, therapeutic with minerals (MULTI-VITAMIN) TABS tablet     omega-3 acid ethyl esters  (LOVAZA) 1 g capsule     No current facility-administered medications for this visit.      Social History     Tobacco Use     Smoking status: Current Every Day Smoker     Packs/day: 0.50     Smokeless tobacco: Never Used     Tobacco comment: smoked since age 20   Substance Use Topics     Alcohol use: Yes     Comment: rarely     Drug use: No         Review of Systems         Objective           Vitals:  No vitals were obtained today due to virtual visit.    Physical Exam   healthy, alert and no distress  PSYCH: Alert and oriented times 3; coherent speech, normal   rate and volume, able to articulate logical thoughts, able   to abstract reason, no tangential thoughts, no hallucinations   or delusions  Her affect is normal  RESP: No cough, no audible wheezing, able to talk in full sentences  Remainder of exam unable to be completed due to telephone visits                Phone call duration: 7 minutes

## 2021-06-03 ENCOUNTER — IMMUNIZATION (OUTPATIENT)
Dept: FAMILY MEDICINE | Facility: CLINIC | Age: 56
End: 2021-06-03
Payer: COMMERCIAL

## 2021-06-03 DIAGNOSIS — Z13.6 CARDIOVASCULAR SCREENING; LDL GOAL LESS THAN 160: ICD-10-CM

## 2021-06-03 LAB
CHOLEST SERPL-MCNC: 139 MG/DL
HDLC SERPL-MCNC: 88 MG/DL
LDLC SERPL CALC-MCNC: 42 MG/DL
NONHDLC SERPL-MCNC: 51 MG/DL
TRIGL SERPL-MCNC: 43 MG/DL

## 2021-06-03 PROCEDURE — 80061 LIPID PANEL: CPT | Performed by: INTERNAL MEDICINE

## 2021-06-03 PROCEDURE — 91301 PR COVID VAC MODERNA 100 MCG/0.5 ML IM: CPT

## 2021-06-03 PROCEDURE — 0011A PR COVID VAC MODERNA 100 MCG/0.5 ML IM: CPT

## 2021-06-03 PROCEDURE — 36415 COLL VENOUS BLD VENIPUNCTURE: CPT | Performed by: INTERNAL MEDICINE

## 2021-06-04 ENCOUNTER — TELEPHONE (OUTPATIENT)
Dept: INTERNAL MEDICINE | Facility: CLINIC | Age: 56
End: 2021-06-04

## 2021-06-04 NOTE — TELEPHONE ENCOUNTER
----- Message from Lebron Childers MD sent at 6/4/2021  8:17 AM CDT -----  Please call and let her know her cholesterol is good no changes needed

## 2021-06-04 NOTE — TELEPHONE ENCOUNTER
Called about results and patient had additional question about Diclofenac sodium topical gel 1% she was prescribed but I didn't see in her med list. She states she puts on her knuckles for arthritis , but then washes her hands after due to not wanting it all over her fingers because she can't keep her hands away from her face and eyes. Any suggestions as to what she can do so she can keep the gel on so it has time to provide relief.    Leonela Melendez on 6/4/2021 at 8:51 AM

## 2021-06-04 NOTE — TELEPHONE ENCOUNTER
Tried to reach patient, left message for patient to call the clinic back. If patient calls back, please relay providers message to the patient.    Lee Bocanegra, CMA

## 2021-06-04 NOTE — TELEPHONE ENCOUNTER
Attempted to contact again, [facility].  Franci Mayer MA on 6/4/2021 at 3:56 PM

## 2021-06-10 ENCOUNTER — ANCILLARY PROCEDURE (OUTPATIENT)
Dept: MAMMOGRAPHY | Facility: OTHER | Age: 56
End: 2021-06-10
Attending: INTERNAL MEDICINE
Payer: COMMERCIAL

## 2021-06-10 DIAGNOSIS — Z12.31 VISIT FOR SCREENING MAMMOGRAM: ICD-10-CM

## 2021-06-10 PROCEDURE — 77067 SCR MAMMO BI INCL CAD: CPT | Mod: TC | Performed by: RADIOLOGY

## 2021-07-01 ENCOUNTER — IMMUNIZATION (OUTPATIENT)
Dept: FAMILY MEDICINE | Facility: CLINIC | Age: 56
End: 2021-07-01
Attending: FAMILY MEDICINE
Payer: COMMERCIAL

## 2021-07-01 PROCEDURE — 91301 PR COVID VAC MODERNA 100 MCG/0.5 ML IM: CPT

## 2021-07-01 PROCEDURE — 0012A PR COVID VAC MODERNA 100 MCG/0.5 ML IM: CPT

## 2021-08-10 DIAGNOSIS — J45.20 INTERMITTENT ASTHMA, WELL CONTROLLED: ICD-10-CM

## 2021-08-10 DIAGNOSIS — R06.02 SOB (SHORTNESS OF BREATH): ICD-10-CM

## 2021-08-13 RX ORDER — DEXAMETHASONE 4 MG/1
TABLET ORAL
Qty: 12 G | Refills: 0 | Status: SHIPPED | OUTPATIENT
Start: 2021-08-13 | End: 2021-09-10

## 2021-08-13 RX ORDER — ALBUTEROL SULFATE 90 UG/1
AEROSOL, METERED RESPIRATORY (INHALATION)
Qty: 18 G | Refills: 0 | Status: SHIPPED | OUTPATIENT
Start: 2021-08-13 | End: 2021-09-10

## 2021-08-13 NOTE — TELEPHONE ENCOUNTER
Routing refill request to provider for review/approval because:  ACT score not current and not within range for refill  ACT Total Scores 5/7/2018 3/15/2019 9/20/2019   ACT TOTAL SCORE - - -   ASTHMA ER VISITS - - -   ASTHMA HOSPITALIZATIONS - - -   ACT TOTAL SCORE (Goal Greater than or Equal to 20) 20 21 22   In the past 12 months, how many times did you visit the emergency room for your asthma without being admitted to the hospital? 0 0 0   In the past 12 months, how many times were you hospitalized overnight because of your asthma? 0 0 0     fluticasone (FLOVENT HFA) 110 MCG/ACT inhaler [Pharmacy Med Name: FLOVENT  MCG INHALER 110 Aerosol] 12 g 1    Sig: INHALE TWO PUFFS BY MOUTH 2 TIMES DAILY. **PATIENT NEEDS OFFICE VISIT FOR ANY FURTHER REFILLS**   T'd up 1 refill requestfor provider review.        albuterol (PROAIR HFA/PROVENTIL HFA/VENTOLIN HFA) 108 (90 Base) MCG/ACT inhaler [Pharmacy Med Name: ALBUTEROL SULFATE  ( 108 (90 BAS Aerosol] 18 g 2    Sig: INHALE 2 PUFFS INTO THE LUNGS EVERY 4 HOURS AS NEEDED FOR SHORTNESS OF BREATH/WHEEZING   T'd up 1 refill request for provider review.      Kathleen Sebastian RN

## 2021-08-13 NOTE — TELEPHONE ENCOUNTER
"Requested Prescriptions   Pending Prescriptions Disp Refills     fluticasone (FLOVENT HFA) 110 MCG/ACT inhaler [Pharmacy Med Name: FLOVENT  MCG INHALER 110 Aerosol] 12 g 1     Sig: INHALE TWO PUFFS BY MOUTH 2 TIMES DAILY. **PATIENT NEEDS OFFICE VISIT FOR ANY FURTHER REFILLS**   Last Written Prescription Date:  2/10/2021  Last Fill Quantity: 12g,  # refills: 3   Last office visit: 2/10/2021 with prescribing provider:     Future Office Visit:        Inhaled Steroids Protocol Failed - 8/10/2021  3:53 PM        Failed - Asthma control assessment score within normal limits in last 6 months     Please review ACT score.   ACT Total Scores 5/7/2018 3/15/2019 9/20/2019   ACT TOTAL SCORE - - -   ASTHMA ER VISITS - - -   ASTHMA HOSPITALIZATIONS - - -   ACT TOTAL SCORE (Goal Greater than or Equal to 20) 20 21 22   In the past 12 months, how many times did you visit the emergency room for your asthma without being admitted to the hospital? 0 0 0   In the past 12 months, how many times were you hospitalized overnight because of your asthma? 0 0 0             Passed - Patient is age 12 or older        Passed - Medication is active on med list        Passed - Recent (6 mo) or future (30 days) visit within the authorizing provider's specialty     Patient had office visit in the last 6 months or has a visit in the next 30 days with authorizing provider or within the authorizing provider's specialty.  See \"Patient Info\" tab in inbasket, or \"Choose Columns\" in Meds & Orders section of the refill encounter.               albuterol (PROAIR HFA/PROVENTIL HFA/VENTOLIN HFA) 108 (90 Base) MCG/ACT inhaler [Pharmacy Med Name: ALBUTEROL SULFATE  ( 108 (90 BAS Aerosol] 18 g 2     Sig: INHALE 2 PUFFS INTO THE LUNGS EVERY 4 HOURS AS NEEDED FOR SHORTNESS OF BREATH/WHEEZING   4/28/2021 18g with 2 refills    Asthma Maintenance Inhalers - Anticholinergics Failed - 8/10/2021  3:53 PM        Failed - Asthma control assessment score within " "normal limits in last 6 months     Please review ACT score.           Passed - Patient is age 12 years or older        Passed - Medication is active on med list        Passed - Recent (6 mo) or future (30 days) visit within the authorizing provider's specialty     Patient had office visit in the last 6 months or has a visit in the next 30 days with authorizing provider or within the authorizing provider's specialty.  See \"Patient Info\" tab in inbasket, or \"Choose Columns\" in Meds & Orders section of the refill encounter.           Short-Acting Beta Agonist Inhalers Protocol  Failed - 8/10/2021  3:53 PM        Failed - Asthma control assessment score within normal limits in last 6 months     Please review ACT score.           Passed - Patient is age 12 or older        Passed - Medication is active on med list        Passed - Recent (6 mo) or future (30 days) visit within the authorizing provider's specialty     Patient had office visit in the last 6 months or has a visit in the next 30 days with authorizing provider or within the authorizing provider's specialty.  See \"Patient Info\" tab in inbasket, or \"Choose Columns\" in Meds & Orders section of the refill encounter.             Routing refill request to provider for review/approval  Kathleen Sebastian RN        "

## 2021-09-08 DIAGNOSIS — R06.02 SOB (SHORTNESS OF BREATH): ICD-10-CM

## 2021-09-08 DIAGNOSIS — J45.20 INTERMITTENT ASTHMA, WELL CONTROLLED: ICD-10-CM

## 2021-09-10 RX ORDER — ALBUTEROL SULFATE 90 UG/1
AEROSOL, METERED RESPIRATORY (INHALATION)
Qty: 18 G | Refills: 0 | Status: SHIPPED | OUTPATIENT
Start: 2021-09-10 | End: 2021-10-27

## 2021-09-10 RX ORDER — DEXAMETHASONE 4 MG/1
TABLET ORAL
Qty: 12 G | Refills: 0 | Status: SHIPPED | OUTPATIENT
Start: 2021-09-10 | End: 2021-10-27

## 2021-09-10 NOTE — TELEPHONE ENCOUNTER
"Requested Prescriptions   Pending Prescriptions Disp Refills    albuterol (PROAIR HFA/PROVENTIL HFA/VENTOLIN HFA) 108 (90 Base) MCG/ACT inhaler [Pharmacy Med Name: ALBUTEROL SULFATE  ( 108 (90 BAS Aerosol] 18 g 0     Sig: INHALE 2 PUFFS INTO THE LUNGS EVERY 4 HOURS AS NEEDED FOR SHORTNESS OF BREATH/WHEEZING        Asthma Maintenance Inhalers - Anticholinergics Failed - 9/8/2021 11:59 AM        Failed - Asthma control assessment score within normal limits in last 6 months     Please review ACT score.           Passed - Patient is age 12 years or older        Passed - Medication is active on med list        Passed - Recent (6 mo) or future (30 days) visit within the authorizing provider's specialty     Patient had office visit in the last 6 months or has a visit in the next 30 days with authorizing provider or within the authorizing provider's specialty.  See \"Patient Info\" tab in inbasket, or \"Choose Columns\" in Meds & Orders section of the refill encounter.           Short-Acting Beta Agonist Inhalers Protocol  Failed - 9/8/2021 11:59 AM        Failed - Asthma control assessment score within normal limits in last 6 months     Please review ACT score.           Passed - Patient is age 12 or older        Passed - Medication is active on med list        Passed - Recent (6 mo) or future (30 days) visit within the authorizing provider's specialty     Patient had office visit in the last 6 months or has a visit in the next 30 days with authorizing provider or within the authorizing provider's specialty.  See \"Patient Info\" tab in inbasket, or \"Choose Columns\" in Meds & Orders section of the refill encounter.               FLOVENT  MCG/ACT inhaler [Pharmacy Med Name: FLOVENT  MCG INHALER 110 Aerosol] 12 g 0     Sig: INHALE TWO PUFFS BY MOUTH 2 TIMES DAILY. **PATIENT NEEDS OFFICE VISIT FOR ANY FURTHER REFILLS**        Inhaled Steroids Protocol Failed - 9/8/2021 11:59 AM        Failed - Asthma control " "assessment score within normal limits in last 6 months     Please review ACT score.           Passed - Patient is age 12 or older        Passed - Medication is active on med list        Passed - Recent (6 mo) or future (30 days) visit within the authorizing provider's specialty     Patient had office visit in the last 6 months or has a visit in the next 30 days with authorizing provider or within the authorizing provider's specialty.  See \"Patient Info\" tab in inbasket, or \"Choose Columns\" in Meds & Orders section of the refill encounter.                ACT Total Scores 5/7/2018 3/15/2019 9/20/2019   ACT TOTAL SCORE - - -   ASTHMA ER VISITS - - -   ASTHMA HOSPITALIZATIONS - - -   ACT TOTAL SCORE (Goal Greater than or Equal to 20) 20 21 22   In the past 12 months, how many times did you visit the emergency room for your asthma without being admitted to the hospital? 0 0 0   In the past 12 months, how many times were you hospitalized overnight because of your asthma? 0 0 0     Routing refill request to provider for review/approval because:  Please consider refills to get to requested follow up in November      Ofelia Gurrola RN  [facility]                 "

## 2021-10-25 DIAGNOSIS — R06.02 SOB (SHORTNESS OF BREATH): ICD-10-CM

## 2021-10-25 DIAGNOSIS — J45.20 INTERMITTENT ASTHMA, WELL CONTROLLED: ICD-10-CM

## 2021-10-27 RX ORDER — ALBUTEROL SULFATE 90 UG/1
AEROSOL, METERED RESPIRATORY (INHALATION)
Qty: 18 G | Refills: 0 | Status: SHIPPED | OUTPATIENT
Start: 2021-10-27 | End: 2021-12-01

## 2021-10-27 RX ORDER — DEXAMETHASONE 4 MG/1
TABLET ORAL
Qty: 12 G | Refills: 0 | Status: SHIPPED | OUTPATIENT
Start: 2021-10-27 | End: 2021-12-01

## 2021-10-27 NOTE — TELEPHONE ENCOUNTER
Albuterol   Routing refill request to provider for review/approval because:  ACT failed RN refill protocol    flovent inhaler  Routing refill request to provider for review/approval because:  ACT failed RN refill protocol    Kathleen Sebastian RN

## 2021-11-29 DIAGNOSIS — J45.20 INTERMITTENT ASTHMA, WELL CONTROLLED: ICD-10-CM

## 2021-11-29 DIAGNOSIS — R06.02 SOB (SHORTNESS OF BREATH): ICD-10-CM

## 2021-11-29 NOTE — LETTER
[address] [address]  Phone: [phone]        December 16, 2021      Zina Harrell                                                                                                                                [address] [facility] [id number]            Dear Ms. Harrell,    We are concerned about your health care.  We recently provided you with a medication refill.  Many medications require routine follow-up with your Doctor.      At this time we ask that: You schedule an appointment for your annual physical and medication recheck. Please call the clinic at [phone] option 1 to schedule.     Your prescription: (Flovent, Ventolin) Has been refilled for 1 time so you may have time for the above noted follow-up.      Thank you,      Lebron Childers MD  Care Team

## 2021-12-01 RX ORDER — ALBUTEROL SULFATE 90 UG/1
AEROSOL, METERED RESPIRATORY (INHALATION)
Qty: 18 G | Refills: 0 | Status: SHIPPED | OUTPATIENT
Start: 2021-12-01 | End: 2022-02-01

## 2021-12-01 RX ORDER — DEXAMETHASONE 4 MG/1
TABLET ORAL
Qty: 12 G | Refills: 0 | Status: SHIPPED | OUTPATIENT
Start: 2021-12-01 | End: 2022-02-01

## 2021-12-01 NOTE — TELEPHONE ENCOUNTER
Flovent  Routing refill request to provider for review/approval because:  Bharti given x1 and patient did not follow up, please advise  ACT failed RN refill protocol    Ventolin  Routing refill request to provider for review/approval because:  Bharti given x1 and patient did not follow up, please advise  ACT failed RN refill protocol      Sending to scheduling for yearly office visit due    Kathleen Sebastian RN

## 2021-12-10 NOTE — TELEPHONE ENCOUNTER
Attempted to reach patient, unable to leave message will have staff try again later. Please help schedule appointment.   Courtney Pinto MA

## 2022-02-01 ENCOUNTER — TELEPHONE (OUTPATIENT)
Dept: INTERNAL MEDICINE | Facility: CLINIC | Age: 57
End: 2022-02-01

## 2022-02-01 ENCOUNTER — OFFICE VISIT (OUTPATIENT)
Dept: INTERNAL MEDICINE | Facility: CLINIC | Age: 57
End: 2022-02-01
Payer: COMMERCIAL

## 2022-02-01 VITALS
WEIGHT: 170.3 LBS | OXYGEN SATURATION: 100 % | TEMPERATURE: 98.9 F | HEIGHT: 64 IN | HEART RATE: 98 BPM | RESPIRATION RATE: 22 BRPM | DIASTOLIC BLOOD PRESSURE: 74 MMHG | SYSTOLIC BLOOD PRESSURE: 136 MMHG | BODY MASS INDEX: 29.08 KG/M2

## 2022-02-01 DIAGNOSIS — M54.42 CHRONIC BILATERAL LOW BACK PAIN WITH LEFT-SIDED SCIATICA: ICD-10-CM

## 2022-02-01 DIAGNOSIS — R60.0 BILATERAL LOWER EXTREMITY EDEMA: ICD-10-CM

## 2022-02-01 DIAGNOSIS — J30.2 CHRONIC SEASONAL ALLERGIC RHINITIS: ICD-10-CM

## 2022-02-01 DIAGNOSIS — Z13.6 CARDIOVASCULAR SCREENING; LDL GOAL LESS THAN 160: ICD-10-CM

## 2022-02-01 DIAGNOSIS — F43.23 ADJUSTMENT DISORDER WITH MIXED ANXIETY AND DEPRESSED MOOD: ICD-10-CM

## 2022-02-01 DIAGNOSIS — J45.20 INTERMITTENT ASTHMA, WELL CONTROLLED: ICD-10-CM

## 2022-02-01 DIAGNOSIS — Z00.00 ENCOUNTER FOR ROUTINE ADULT HEALTH EXAMINATION WITHOUT ABNORMAL FINDINGS: Primary | ICD-10-CM

## 2022-02-01 DIAGNOSIS — G89.29 CHRONIC BILATERAL LOW BACK PAIN WITH LEFT-SIDED SCIATICA: ICD-10-CM

## 2022-02-01 DIAGNOSIS — Z23 HIGH PRIORITY FOR 2019-NCOV VACCINE: ICD-10-CM

## 2022-02-01 DIAGNOSIS — M48.00 CENTRAL STENOSIS OF SPINAL CANAL: ICD-10-CM

## 2022-02-01 DIAGNOSIS — R06.02 SOB (SHORTNESS OF BREATH): ICD-10-CM

## 2022-02-01 LAB
ALBUMIN SERPL-MCNC: 3.4 G/DL (ref 3.4–5)
ALP SERPL-CCNC: 101 U/L (ref 40–150)
ALT SERPL W P-5'-P-CCNC: 37 U/L (ref 0–50)
ANION GAP SERPL CALCULATED.3IONS-SCNC: 6 MMOL/L (ref 3–14)
AST SERPL W P-5'-P-CCNC: 30 U/L (ref 0–45)
BASOPHILS # BLD AUTO: 0.1 10E3/UL (ref 0–0.2)
BASOPHILS NFR BLD AUTO: 1 %
BILIRUB SERPL-MCNC: 0.2 MG/DL (ref 0.2–1.3)
BUN SERPL-MCNC: 14 MG/DL (ref 7–30)
CALCIUM SERPL-MCNC: 8.6 MG/DL (ref 8.5–10.1)
CHLORIDE BLD-SCNC: 108 MMOL/L (ref 94–109)
CHOLEST SERPL-MCNC: 129 MG/DL
CO2 SERPL-SCNC: 26 MMOL/L (ref 20–32)
CREAT SERPL-MCNC: 0.81 MG/DL (ref 0.52–1.04)
EOSINOPHIL # BLD AUTO: 0.2 10E3/UL (ref 0–0.7)
EOSINOPHIL NFR BLD AUTO: 2 %
ERYTHROCYTE [DISTWIDTH] IN BLOOD BY AUTOMATED COUNT: 14 % (ref 10–15)
FASTING STATUS PATIENT QL REPORTED: YES
GFR SERPL CREATININE-BSD FRML MDRD: 86 ML/MIN/1.73M2
GLUCOSE BLD-MCNC: 108 MG/DL (ref 70–99)
HCT VFR BLD AUTO: 36 % (ref 35–47)
HDLC SERPL-MCNC: 86 MG/DL
HGB BLD-MCNC: 11.9 G/DL (ref 11.7–15.7)
IMM GRANULOCYTES # BLD: 0 10E3/UL
IMM GRANULOCYTES NFR BLD: 0 %
LDLC SERPL CALC-MCNC: 35 MG/DL
LYMPHOCYTES # BLD AUTO: 1.6 10E3/UL (ref 0.8–5.3)
LYMPHOCYTES NFR BLD AUTO: 23 %
MCH RBC QN AUTO: 32.3 PG (ref 26.5–33)
MCHC RBC AUTO-ENTMCNC: 33.1 G/DL (ref 31.5–36.5)
MCV RBC AUTO: 98 FL (ref 78–100)
MONOCYTES # BLD AUTO: 0.6 10E3/UL (ref 0–1.3)
MONOCYTES NFR BLD AUTO: 9 %
NEUTROPHILS # BLD AUTO: 4.5 10E3/UL (ref 1.6–8.3)
NEUTROPHILS NFR BLD AUTO: 65 %
NONHDLC SERPL-MCNC: 43 MG/DL
NRBC # BLD AUTO: 0 10E3/UL
NRBC BLD AUTO-RTO: 0 /100
PLATELET # BLD AUTO: 276 10E3/UL (ref 150–450)
POTASSIUM BLD-SCNC: 3.6 MMOL/L (ref 3.4–5.3)
PROT SERPL-MCNC: 6.6 G/DL (ref 6.8–8.8)
RBC # BLD AUTO: 3.68 10E6/UL (ref 3.8–5.2)
SODIUM SERPL-SCNC: 140 MMOL/L (ref 133–144)
TRIGL SERPL-MCNC: 38 MG/DL
WBC # BLD AUTO: 7 10E3/UL (ref 4–11)

## 2022-02-01 PROCEDURE — 99396 PREV VISIT EST AGE 40-64: CPT | Performed by: INTERNAL MEDICINE

## 2022-02-01 PROCEDURE — 91306 COVID-19,PF,MODERNA (18+ YRS BOOSTER .25ML): CPT | Performed by: INTERNAL MEDICINE

## 2022-02-01 PROCEDURE — 0064A COVID-19,PF,MODERNA (18+ YRS BOOSTER .25ML): CPT | Performed by: INTERNAL MEDICINE

## 2022-02-01 PROCEDURE — 80061 LIPID PANEL: CPT | Performed by: INTERNAL MEDICINE

## 2022-02-01 PROCEDURE — 85025 COMPLETE CBC W/AUTO DIFF WBC: CPT | Performed by: INTERNAL MEDICINE

## 2022-02-01 PROCEDURE — 80053 COMPREHEN METABOLIC PANEL: CPT | Performed by: INTERNAL MEDICINE

## 2022-02-01 PROCEDURE — 36415 COLL VENOUS BLD VENIPUNCTURE: CPT | Performed by: INTERNAL MEDICINE

## 2022-02-01 RX ORDER — DEXAMETHASONE 4 MG/1
2 TABLET ORAL 2 TIMES DAILY
Qty: 12 G | Refills: 11 | Status: SHIPPED | OUTPATIENT
Start: 2022-02-01 | End: 2023-02-24

## 2022-02-01 RX ORDER — CITALOPRAM HYDROBROMIDE 20 MG/1
30 TABLET ORAL DAILY
Qty: 135 TABLET | Refills: 3 | Status: SHIPPED | OUTPATIENT
Start: 2022-02-01 | End: 2022-08-23

## 2022-02-01 RX ORDER — FUROSEMIDE 20 MG
TABLET ORAL
Qty: 90 TABLET | Refills: 3 | Status: SHIPPED | OUTPATIENT
Start: 2022-02-01 | End: 2023-01-25

## 2022-02-01 RX ORDER — CETIRIZINE HYDROCHLORIDE 10 MG/1
TABLET ORAL
Qty: 90 TABLET | Refills: 3 | Status: SHIPPED | OUTPATIENT
Start: 2022-02-01 | End: 2022-08-23

## 2022-02-01 RX ORDER — ALBUTEROL SULFATE 90 UG/1
AEROSOL, METERED RESPIRATORY (INHALATION)
Qty: 18 G | Refills: 3 | Status: SHIPPED | OUTPATIENT
Start: 2022-02-01 | End: 2022-07-01

## 2022-02-01 ASSESSMENT — ASTHMA QUESTIONNAIRES: ACT_TOTALSCORE: 20

## 2022-02-01 ASSESSMENT — PATIENT HEALTH QUESTIONNAIRE - PHQ9: SUM OF ALL RESPONSES TO PHQ QUESTIONS 1-9: 7

## 2022-02-01 ASSESSMENT — MIFFLIN-ST. JEOR: SCORE: 1349.54

## 2022-02-01 ASSESSMENT — PAIN SCALES - GENERAL: PAINLEVEL: MILD PAIN (3)

## 2022-02-01 NOTE — PROGRESS NOTES
SUBJECTIVE:   CC: Zina Harrell is an 56 year old woman who presents for preventive health visit.     Healthy Habits:    Getting at least 3 servings of Calcium per day:  Yes    Bi-annual eye exam:  Yes    Dental care twice a year:  NO    Sleep apnea or symptoms of sleep apnea:  None    Diet:  Regular (no restrictions)    Frequency of exercise:  None    Duration of exercise:  Other    Taking medications regularly:  No    Barriers to taking medications:  None    Medication side effects:  None    PHQ-2 Total Score:    Additional concerns today:  No    Doing alright.   Weight is down a few pounds.   Dad continues to live with dementia he is in a living facility.   Living with her stepdad who has mental health and [facility].     Stress for what to do with stuff from her dad and paperwork.     Back pain and leg pains, no vehicle. Hard to get to places like therapy.  Has had a EMG.     Pap done last year. Good for 5 years.     Taking lasix and just a little diuretic.      Today's PHQ-2 Score:   PHQ-2 ( 1999 Pfizer) 2/10/2021   Q1: Little interest or pleasure in doing things 2   Q2: Feeling down, depressed or hopeless 2   PHQ-2 Score 4   PHQ-2 Total Score (12-17 Years)- Positive if 3 or more points; Administer PHQ-A if positive 4   Q1: Little interest or pleasure in doing things More than half the days   Q2: Feeling down, depressed or hopeless More than half the days   PHQ-2 Score 4   taking Celexa and does something because she noticed when ran out.     Abuse: Current or Past (Physical, Sexual or Emotional) - No  Do you feel safe in your environment? Yes    Have you ever done Advance Care Planning? (For example, a Health Directive, POLST, or a discussion with a medical provider or your loved ones about your wishes): No, advance care planning information given to patient to review.  Patient declined advance care planning discussion at this time.    Social History     Tobacco Use     Smoking status:  Current Every Day Smoker     Packs/day: 0.25     Smokeless tobacco: Never Used     Tobacco comment: smoked since age 20   Substance Use Topics     Alcohol use: Yes     Comment: rarely     If you drink alcohol do you typically have >3 drinks per day or >7 drinks per week? No    Alcohol Use 2/10/2021   Prescreen: >3 drinks/day or >7 drinks/week? No   Prescreen: >3 drinks/day or >7 drinks/week? -       Reviewed orders with patient.  Reviewed health maintenance and updated orders accordingly - Yes  Lab work is in process    Breast Cancer Screening:  Any new diagnosis of family breast, ovarian, or bowel cancer? No    FHS-7: No flowsheet data found.    Mammogram Screening: Recommended annual mammography  Pertinent mammograms are reviewed under the imaging tab.    History of abnormal Pap smear: NO - age 30-65 PAP every 5 years with negative HPV co-testing recommended  PAP / HPV Latest Ref Rng & Units 2/10/2021 11/27/2017 9/14/2016   PAP (Historical) - NIL NIL NIL   HPV16 NEG:Negative Negative Negative Negative   HPV18 NEG:Negative Negative Negative Negative   HRHPV NEG:Negative Negative Negative Negative     Reviewed and updated as needed this visit by clinical staff  Tobacco   Meds   Med Hx  Surg Hx  Fam Hx  Soc Hx       Reviewed and updated as needed this visit by Provider                 Review of Systems  CONSTITUTIONAL: NEGATIVE for fever, chills, change in weight  INTEGUMENTARY/SKIN: NEGATIVE for worrisome rashes, moles or lesions  EYES: NEGATIVE for vision changes or irritation  ENT: NEGATIVE for ear, mouth and throat problems  RESP: NEGATIVE for significant cough or SOB  BREAST: NEGATIVE for masses, tenderness or discharge  CV: NEGATIVE for chest pain, palpitations or peripheral edema  GI: NEGATIVE for nausea, abdominal pain, heartburn, or change in bowel habits  : NEGATIVE for unusual urinary or vaginal symptoms. No vaginal bleeding.  MUSCULOSKELETAL: NEGATIVE for significant arthralgias or myalgia  NEURO:  "neuropathy in her feet.   PSYCHIATRIC: depressed     OBJECTIVE:   /74 (BP Location: Left arm, Patient Position: Sitting, Cuff Size: Adult Large)   Pulse 98   Temp 98.9  F (37.2  C) (Temporal)   Resp 22   Ht 1.613 m (5' 3.5\")   Wt 77.2 kg (170 lb 4.8 oz)   LMP 03/14/2016 (Within Months)   SpO2 100%   BMI 29.69 kg/m    Physical Exam  GENERAL: healthy, alert and no distress  EYES: Eyes grossly normal to inspection, PERRL and conjunctivae and sclerae normal  HENT: ear canals and TM's normal, nose and mouth without ulcers or lesions  NECK: no adenopathy, no asymmetry, masses, or scars and thyroid normal to palpation  RESP: lungs clear to auscultation - no rales, rhonchi or wheezes  CV: regular rate and rhythm, normal S1 S2, no S3 or S4, no murmur, click or rub, no peripheral edema and peripheral pulses strong  ABDOMEN: soft, nontender, no hepatosplenomegaly, no masses and bowel sounds normal  MS: no gross musculoskeletal defects noted, no edema  SKIN: no suspicious lesions or rashes  NEURO: Normal strength and tone, mentation intact and speech normal  PSYCH: mentation appears normal, affect normal/bright        ASSESSMENT/PLAN:       ICD-10-CM    1. Encounter for routine adult health examination without abnormal findings  Z00.00    2. Adjustment disorder with mixed anxiety and depressed mood  F43.23 citalopram (CELEXA) 20 MG tablet     Comprehensive metabolic panel (BMP + Alb, Alk Phos, ALT, AST, Total. Bili, TP)     CBC with platelets and differential   3. Chronic bilateral low back pain with left-sided sciatica  M54.42 Physical Therapy Referral    G89.29 Neurosurgery Referral     Comprehensive metabolic panel (BMP + Alb, Alk Phos, ALT, AST, Total. Bili, TP)     CBC with platelets and differential   4. Central stenosis of spinal canal  M48.00 Neurosurgery Referral     Comprehensive metabolic panel (BMP + Alb, Alk Phos, ALT, AST, Total. Bili, TP)     CBC with platelets and differential   5. Asthma, " "intermittent controlled  J45.20 fluticasone (FLOVENT HFA) 110 MCG/ACT inhaler     VENTOLIN  (90 Base) MCG/ACT inhaler   6. Bilateral lower extremity edema  R60.0 furosemide (LASIX) 20 MG tablet   7. SOB (shortness of breath)  R06.02 VENTOLIN  (90 Base) MCG/ACT inhaler   8. Chronic seasonal allergic rhinitis  J30.2 cetirizine (ZYRTEC) 10 MG tablet   9. CARDIOVASCULAR SCREENING; LDL GOAL LESS THAN 160  Z13.6 Lipid panel reflex to direct LDL Fasting     Patient here for regular routine visit.  She is doing okay, living with her stepdad.    There are some stress from her father having dementia and living in a care facility.  She still is to help with those decisions    Asthma is better controlled continue her inhalers and we will try to get her to stop smoking completely  Chronic allergies worse with having her cat but she will not give up her cat continue Zyrtec    Central stenosis of the spinal canal and chronic back pain got better with injection months.  She had some tingling down in her feet question of neuropathy versus the spinal cord disease.  Will refer back to neurosurgery    Depression is slightly worse we will increase her Celexa from 20 to 30 mg.      COUNSELING:  Reviewed preventive health counseling, as reflected in patient instructions       Regular exercise       Healthy diet/nutrition       Immunizations    covid booster              Estimated body mass index is 29.69 kg/m  as calculated from the following:    Height as of this encounter: 1.613 m (5' 3.5\").    Weight as of this encounter: 77.2 kg (170 lb 4.8 oz).        She reports that she has been smoking. She has been smoking about 0.25 packs per day. She has never used smokeless tobacco.  Tobacco Cessation Action Plan:   Information offered: Patient not interested at this time      Counseling Resources:  ATP IV Guidelines  Pooled Cohorts Equation Calculator  Breast Cancer Risk Calculator  BRCA-Related Cancer Risk Assessment: FHS-7 " Tool  FRAX Risk Assessment  ICSI Preventive Guidelines  Dietary Guidelines for Americans, 2010  USDA's MyPlate  ASA Prophylaxis  Lung CA Screening    Lebron Childers MD  [facility]

## 2022-02-01 NOTE — LETTER
"February 2, 2022      Zina Harrell  [address] [address]        Dear ,    We are writing to inform you of your test results.    Per Dr. Childers, \"your labs are good, normal liver, kidneys, cholesterol. No changes needed.\"    Resulted Orders   Lipid panel reflex to direct LDL Fasting   Result Value Ref Range    Cholesterol 129 <200 mg/dL    Triglycerides 38 <150 mg/dL    Direct Measure HDL 86 >=50 mg/dL    LDL Cholesterol Calculated 35 <=100 mg/dL    Non HDL Cholesterol 43 <130 mg/dL    Patient Fasting > 8hrs? Yes     Narrative    Cholesterol  Desirable:  <200 mg/dL    Triglycerides  Normal:  Less than 150 mg/dL  Borderline High:  150-199 mg/dL  High:  200-499 mg/dL  Very High:  Greater than or equal to 500 mg/dL    Direct Measure HDL  Female:  Greater than or equal to 50 mg/dL   Male:  Greater than or equal to 40 mg/dL    LDL Cholesterol  Desirable:  <100mg/dL  Above Desirable:  100-129 mg/dL   Borderline High:  130-159 mg/dL   High:  160-189 mg/dL   Very High:  >= 190 mg/dL    Non HDL Cholesterol  Desirable:  130 mg/dL  Above Desirable:  130-159 mg/dL  Borderline High:  160-189 mg/dL  High:  190-219 mg/dL  Very High:  Greater than or equal to 220 mg/dL   Comprehensive metabolic panel (BMP + Alb, Alk Phos, ALT, AST, Total. Bili, TP)   Result Value Ref Range    Sodium 140 133 - 144 mmol/L    Potassium 3.6 3.4 - 5.3 mmol/L    Chloride 108 94 - 109 mmol/L    Carbon Dioxide (CO2) 26 20 - 32 mmol/L    Anion Gap 6 3 - 14 mmol/L    Urea Nitrogen 14 7 - 30 mg/dL    Creatinine 0.81 0.52 - 1.04 mg/dL    Calcium 8.6 8.5 - 10.1 mg/dL    Glucose 108 (H) 70 - 99 mg/dL    Alkaline Phosphatase 101 40 - 150 U/L    AST 30 0 - 45 U/L    ALT 37 0 - 50 U/L    Protein Total 6.6 (L) 6.8 - 8.8 g/dL    Albumin 3.4 3.4 - 5.0 g/dL    Bilirubin Total 0.2 0.2 - 1.3 mg/dL    GFR Estimate 86 >60 mL/min/1.73m2      Comment:      Effective December 21, 2021 eGFRcr in adults is calculated using the 2021 " CKD-EPI creatinine equation which includes age and gender (Edenilson et al., NEJ, DOI: 10.1056/ESOJth7431527)   CBC with platelets and differential   Result Value Ref Range    WBC Count 7.0 4.0 - 11.0 10e3/uL    RBC Count 3.68 (L) 3.80 - 5.20 10e6/uL    Hemoglobin 11.9 11.7 - 15.7 g/dL    Hematocrit 36.0 35.0 - 47.0 %    MCV 98 78 - 100 fL    MCH 32.3 26.5 - 33.0 pg    MCHC 33.1 31.5 - 36.5 g/dL    RDW 14.0 10.0 - 15.0 %    Platelet Count 276 150 - 450 10e3/uL    % Neutrophils 65 %    % Lymphocytes 23 %    % Monocytes 9 %    % Eosinophils 2 %    % Basophils 1 %    % Immature Granulocytes 0 %    NRBCs per 100 WBC 0 <1 /100    Absolute Neutrophils 4.5 1.6 - 8.3 10e3/uL    Absolute Lymphocytes 1.6 0.8 - 5.3 10e3/uL    Absolute Monocytes 0.6 0.0 - 1.3 10e3/uL    Absolute Eosinophils 0.2 0.0 - 0.7 10e3/uL    Absolute Basophils 0.1 0.0 - 0.2 10e3/uL    Absolute Immature Granulocytes 0.0 <=0.4 10e3/uL    Absolute NRBCs 0.0 10e3/uL       If you have any questions or concerns, please call the clinic at the number listed above.       Sincerely,    Dr. Childers/Care Team

## 2022-02-01 NOTE — TELEPHONE ENCOUNTER
----- Message from Lebron Childers MD sent at 2/1/2022  2:19 PM CST -----  Please call and let her know that her labs are good, normal liver, kidneys, cholesterol.  No changes needed

## 2022-02-02 NOTE — TELEPHONE ENCOUNTER
Called patient and gave message below. Mailing a copy of results per patient request.  Giovanna Bettencourt CMA

## 2022-02-04 ENCOUNTER — HOSPITAL ENCOUNTER (OUTPATIENT)
Dept: PHYSICAL THERAPY | Facility: CLINIC | Age: 57
Setting detail: THERAPIES SERIES
End: 2022-02-04
Attending: INTERNAL MEDICINE
Payer: COMMERCIAL

## 2022-02-04 DIAGNOSIS — M54.42 CHRONIC BILATERAL LOW BACK PAIN WITH LEFT-SIDED SCIATICA: ICD-10-CM

## 2022-02-04 DIAGNOSIS — G89.29 CHRONIC BILATERAL LOW BACK PAIN WITH LEFT-SIDED SCIATICA: ICD-10-CM

## 2022-02-04 PROCEDURE — 97110 THERAPEUTIC EXERCISES: CPT | Mod: GP | Performed by: PHYSICAL THERAPIST

## 2022-02-04 PROCEDURE — 97161 PT EVAL LOW COMPLEX 20 MIN: CPT | Mod: GP | Performed by: PHYSICAL THERAPIST

## 2022-02-07 NOTE — PROGRESS NOTES
[facility]    OUTPATIENT PHYSICAL THERAPY ORTHOPEDIC EVALUATION  PLAN OF TREATMENT FOR OUTPATIENT REHABILITATION  (COMPLETE FOR INITIAL CLAIMS ONLY)  Patient's Last Name, First Name, M.I.  YOB: 1965  Zina Harrell    Provider s Name:  [facility]   Medical Record No.  [id number]   Start of Care Date:  02/04/22   Onset Date:  01/01/82   Type:     _X__PT   ___OT   ___SLP Medical Diagnosis:  Low back pain.       PT Diagnosis:  Low back pain   Visits from SOC:  1      _________________________________________________________________________________  Plan of Treatment/Functional Goals:  joint mobilization,manual therapy,neuromuscular re-education,ROM,strengthening,stretching     Electrical stimulation,Hot packs,TENS,Traction,Ultrasound  as needed  Goals  Goal Identifier: NGUYỄN  Goal Description: Pt will demonstrate 10% improvement per NGUYỄN in order to demonstrate functional improvement of low back  Target Date: 04/01/22    Goal Identifier: HEP  Goal Description: Pt will be independent with HEP in order to improve lumbar motor control.  Target Date: 04/01/22                                                                      Therapy Frequency:     Predicted Duration of Therapy Intervention:  8 weeks    Francois Stanton, PT                 I CERTIFY THE NEED FOR THESE SERVICES FURNISHED UNDER        THIS PLAN OF TREATMENT AND WHILE UNDER MY CARE     (Physician co-signature of this document indicates review and certification of the therapy plan).                       Certification Date From:  02/04/22   Certification Date To:  04/01/22    Referring Provider:  Dr. Lebron Childers    Initial Assessment        See Epic Evaluation Start of Care Date: 02/04/22
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
## 2022-02-23 ENCOUNTER — TELEPHONE (OUTPATIENT)
Dept: PHYSICAL THERAPY | Facility: OTHER | Age: 57
End: 2022-02-23
Payer: COMMERCIAL

## 2022-04-21 NOTE — DISCHARGE SUMMARY
[facility]    Outpatient Physical Therapy Discharge Note  Patient: Zina Harrell  : 1965    Beginning/End Dates of Reporting Period:  One session for assessment on 2022    Referring Provider: Lebron Childers MD    Therapy Diagnosis: Low back pain     Client Self Report: See evaluation 2022    Objective Measurements:  Please refer to initial evaluation. Pt did not keep appt or canceled appts. She did not respond to voice messages left regarding her plan for PT.     Goals:  Goal Identifier NGUYỄN   Goal Description Pt will demonstrate 10% improvement per NGUYỄN in order to demonstrate functional improvement of low back   Target Date 22   Date Met      Progress (detail required for progress note):       Goal Identifier HEP   Goal Description Pt will be independent with HEP in order to improve lumbar motor control.   Target Date 22   Date Met      Progress (detail required for progress note):       Goals were not assessed.       Plan:  Other: Discharge as she has not returned to PT since evaluation    Discharge:    Reason for Discharge:Patient has failed to schedule further appointments. Did not keep appts    Equipment Issued: none    Discharge Plan: did not complete PT plan

## 2022-07-01 DIAGNOSIS — R06.02 SOB (SHORTNESS OF BREATH): ICD-10-CM

## 2022-07-01 DIAGNOSIS — J45.20 INTERMITTENT ASTHMA, WELL CONTROLLED: ICD-10-CM

## 2022-07-01 RX ORDER — ALBUTEROL SULFATE 90 UG/1
AEROSOL, METERED RESPIRATORY (INHALATION)
Qty: 18 G | Refills: 3 | Status: SHIPPED | OUTPATIENT
Start: 2022-07-01 | End: 2023-01-25

## 2022-08-23 ENCOUNTER — VIRTUAL VISIT (OUTPATIENT)
Dept: INTERNAL MEDICINE | Facility: CLINIC | Age: 57
End: 2022-08-23
Payer: COMMERCIAL

## 2022-08-23 DIAGNOSIS — Z72.0 TOBACCO ABUSE: ICD-10-CM

## 2022-08-23 DIAGNOSIS — J30.2 CHRONIC SEASONAL ALLERGIC RHINITIS: Primary | ICD-10-CM

## 2022-08-23 PROCEDURE — 99213 OFFICE O/P EST LOW 20 MIN: CPT | Mod: 95 | Performed by: INTERNAL MEDICINE

## 2022-08-23 RX ORDER — CETIRIZINE HYDROCHLORIDE 10 MG/1
10 TABLET ORAL 2 TIMES DAILY
Qty: 180 TABLET | Refills: 3 | Status: SHIPPED | OUTPATIENT
Start: 2022-08-23 | End: 2023-09-15

## 2022-08-23 ASSESSMENT — ASTHMA QUESTIONNAIRES: ACT_TOTALSCORE: 23
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
## 2023-01-24 DIAGNOSIS — R06.02 SOB (SHORTNESS OF BREATH): ICD-10-CM

## 2023-01-24 DIAGNOSIS — J45.20 INTERMITTENT ASTHMA, WELL CONTROLLED: ICD-10-CM

## 2023-01-24 DIAGNOSIS — R60.0 BILATERAL LOWER EXTREMITY EDEMA: ICD-10-CM

## 2023-01-25 RX ORDER — ALBUTEROL SULFATE 90 UG/1
AEROSOL, METERED RESPIRATORY (INHALATION)
Qty: 18 G | Refills: 3 | Status: SHIPPED | OUTPATIENT
Start: 2023-01-25 | End: 2023-10-17

## 2023-01-25 RX ORDER — FUROSEMIDE 20 MG
TABLET ORAL
Qty: 90 TABLET | Refills: 3 | Status: SHIPPED | OUTPATIENT
Start: 2023-01-25 | End: 2023-10-17

## 2023-01-25 NOTE — TELEPHONE ENCOUNTER
Prescription approved per [facility] Refill Protocol.  Suri Olmstead RN on 1/25/2023 at 3:07 PM

## 2023-02-24 DIAGNOSIS — J45.20 INTERMITTENT ASTHMA, WELL CONTROLLED: ICD-10-CM

## 2023-02-24 RX ORDER — DEXAMETHASONE 4 MG/1
2 TABLET ORAL 2 TIMES DAILY
Qty: 12 G | Refills: 0 | Status: SHIPPED | OUTPATIENT
Start: 2023-02-24 | End: 2023-10-17

## 2023-02-24 NOTE — TELEPHONE ENCOUNTER
Pending Prescriptions:                       Disp   Refills    FLOVENT  MCG/ACT inhaler [Pharmacy*12 g   0            Sig: Inhale 2 puffs into the lungs 2 times daily    Medication is being filled for 1 time dorothy refill only due to:  Patient is due for asthma    Please call and help schedule.  Thank you!    Suri Olmstead RN on 2/24/2023 at 2:00 PM

## 2023-02-24 NOTE — LETTER
[address] [id number] [phone]        February 27, 2023    [facility]ina Harrell  [address] [facility] [address]          Dear Zina,    We are concerned about your health care.  We recently provided you with a medication refill.  Many medications require routine follow-up with your Doctor.      At this time we ask that: You schedule a routine office visit with your physician to follow your asthma Call the clinic at [phone] Option 1 to schedule.     Your prescription:  No further refills will be given until your follow up care is completed.      Thank you,      Sincerely,        Lebron Childers MD

## 2023-05-01 ENCOUNTER — HOSPITAL ENCOUNTER (EMERGENCY)
Facility: CLINIC | Age: 58
Discharge: HOME | End: 2023-05-02
Attending: NURSE PRACTITIONER | Admitting: NURSE PRACTITIONER
Payer: COMMERCIAL

## 2023-05-01 ENCOUNTER — APPOINTMENT (OUTPATIENT)
Dept: CT IMAGING | Facility: CLINIC | Age: 58
End: 2023-05-01
Attending: NURSE PRACTITIONER
Payer: COMMERCIAL

## 2023-05-01 DIAGNOSIS — K05.6 PERIODONTAL DISEASE: ICD-10-CM

## 2023-05-01 DIAGNOSIS — L03.213 PRESEPTAL CELLULITIS OF LEFT EYE: ICD-10-CM

## 2023-05-01 DIAGNOSIS — L03.211 FACIAL CELLULITIS: ICD-10-CM

## 2023-05-01 DIAGNOSIS — K04.7 DENTAL ABSCESS: ICD-10-CM

## 2023-05-01 DIAGNOSIS — J01.00 ACUTE ABSCESS OF MAXILLARY SINUS: ICD-10-CM

## 2023-05-01 LAB
ANION GAP SERPL CALCULATED.3IONS-SCNC: 10 MMOL/L (ref 7–15)
BASOPHILS # BLD MANUAL: 0 10E3/UL (ref 0–0.2)
BASOPHILS NFR BLD MANUAL: 0 %
BUN SERPL-MCNC: 16.9 MG/DL (ref 6–20)
CALCIUM SERPL-MCNC: 9.9 MG/DL (ref 8.6–10)
CHLORIDE SERPL-SCNC: 100 MMOL/L (ref 98–107)
CREAT SERPL-MCNC: 0.99 MG/DL (ref 0.51–0.95)
DEPRECATED HCO3 PLAS-SCNC: 25 MMOL/L (ref 22–29)
EOSINOPHIL # BLD MANUAL: 0 10E3/UL (ref 0–0.7)
EOSINOPHIL NFR BLD MANUAL: 0 %
ERYTHROCYTE [DISTWIDTH] IN BLOOD BY AUTOMATED COUNT: 13.1 % (ref 10–15)
GFR SERPL CREATININE-BSD FRML MDRD: 67 ML/MIN/1.73M2
GLUCOSE SERPL-MCNC: 115 MG/DL (ref 70–99)
HCT VFR BLD AUTO: 40.5 % (ref 35–47)
HGB BLD-MCNC: 13.9 G/DL (ref 11.7–15.7)
LACTATE SERPL-SCNC: 1.5 MMOL/L (ref 0.7–2)
LYMPHOCYTES # BLD MANUAL: 1.1 10E3/UL (ref 0.8–5.3)
LYMPHOCYTES NFR BLD MANUAL: 7 %
MCH RBC QN AUTO: 32.9 PG (ref 26.5–33)
MCHC RBC AUTO-ENTMCNC: 34.3 G/DL (ref 31.5–36.5)
MCV RBC AUTO: 96 FL (ref 78–100)
METAMYELOCYTES # BLD MANUAL: 0.3 10E3/UL
METAMYELOCYTES NFR BLD MANUAL: 2 %
MONOCYTES # BLD MANUAL: 0.2 10E3/UL (ref 0–1.3)
MONOCYTES NFR BLD MANUAL: 1 %
NEUTROPHILS # BLD MANUAL: 13.9 10E3/UL (ref 1.6–8.3)
NEUTROPHILS NFR BLD MANUAL: 90 %
PLAT MORPH BLD: ABNORMAL
PLATELET # BLD AUTO: 126 10E3/UL (ref 150–450)
POTASSIUM SERPL-SCNC: 3.5 MMOL/L (ref 3.4–5.3)
RBC # BLD AUTO: 4.23 10E6/UL (ref 3.8–5.2)
RBC MORPH BLD: ABNORMAL
SODIUM SERPL-SCNC: 135 MMOL/L (ref 136–145)
WBC # BLD AUTO: 15.4 10E3/UL (ref 4–11)

## 2023-05-01 PROCEDURE — 250N000011 HC RX IP 250 OP 636: Performed by: NURSE PRACTITIONER

## 2023-05-01 PROCEDURE — 99285 EMERGENCY DEPT VISIT HI MDM: CPT | Mod: 25 | Performed by: NURSE PRACTITIONER

## 2023-05-01 PROCEDURE — 99285 EMERGENCY DEPT VISIT HI MDM: CPT | Performed by: NURSE PRACTITIONER

## 2023-05-01 PROCEDURE — 83605 ASSAY OF LACTIC ACID: CPT | Performed by: NURSE PRACTITIONER

## 2023-05-01 PROCEDURE — 70487 CT MAXILLOFACIAL W/DYE: CPT

## 2023-05-01 PROCEDURE — 82310 ASSAY OF CALCIUM: CPT | Performed by: NURSE PRACTITIONER

## 2023-05-01 PROCEDURE — 250N000009 HC RX 250: Performed by: NURSE PRACTITIONER

## 2023-05-01 PROCEDURE — 85007 BL SMEAR W/DIFF WBC COUNT: CPT | Performed by: NURSE PRACTITIONER

## 2023-05-01 PROCEDURE — 96365 THER/PROPH/DIAG IV INF INIT: CPT | Performed by: NURSE PRACTITIONER

## 2023-05-01 PROCEDURE — 86140 C-REACTIVE PROTEIN: CPT | Performed by: NURSE PRACTITIONER

## 2023-05-01 PROCEDURE — 85027 COMPLETE CBC AUTOMATED: CPT | Performed by: NURSE PRACTITIONER

## 2023-05-01 PROCEDURE — 36415 COLL VENOUS BLD VENIPUNCTURE: CPT | Performed by: NURSE PRACTITIONER

## 2023-05-01 RX ORDER — OMEGA-3 FATTY ACIDS/FISH OIL 300-1000MG
600 CAPSULE ORAL EVERY 4 HOURS PRN
COMMUNITY

## 2023-05-01 RX ORDER — CEFTRIAXONE 2 G/1
2 INJECTION, POWDER, FOR SOLUTION INTRAMUSCULAR; INTRAVENOUS ONCE
Status: COMPLETED | OUTPATIENT
Start: 2023-05-01 | End: 2023-05-01

## 2023-05-01 RX ORDER — IOPAMIDOL 755 MG/ML
500 INJECTION, SOLUTION INTRAVASCULAR ONCE
Status: COMPLETED | OUTPATIENT
Start: 2023-05-01 | End: 2023-05-01

## 2023-05-01 RX ADMIN — IOPAMIDOL 75 ML: 755 INJECTION, SOLUTION INTRAVENOUS at 22:38

## 2023-05-01 RX ADMIN — CEFTRIAXONE SODIUM 2 G: 2 INJECTION, POWDER, FOR SOLUTION INTRAMUSCULAR; INTRAVENOUS at 23:06

## 2023-05-01 RX ADMIN — SODIUM CHLORIDE 60 ML: 9 INJECTION, SOLUTION INTRAVENOUS at 22:37

## 2023-05-01 ASSESSMENT — ACTIVITIES OF DAILY LIVING (ADL): ADLS_ACUITY_SCORE: 35

## 2023-05-02 VITALS
SYSTOLIC BLOOD PRESSURE: 106 MMHG | WEIGHT: 170 LBS | DIASTOLIC BLOOD PRESSURE: 68 MMHG | OXYGEN SATURATION: 97 % | RESPIRATION RATE: 20 BRPM | HEIGHT: 64 IN | HEART RATE: 98 BPM | BODY MASS INDEX: 29.02 KG/M2 | TEMPERATURE: 99 F

## 2023-05-02 LAB — CRP SERPL-MCNC: 140.74 MG/L

## 2023-05-02 PROCEDURE — 96375 TX/PRO/DX INJ NEW DRUG ADDON: CPT | Performed by: NURSE PRACTITIONER

## 2023-05-02 PROCEDURE — 250N000011 HC RX IP 250 OP 636: Performed by: NURSE PRACTITIONER

## 2023-05-02 PROCEDURE — 96361 HYDRATE IV INFUSION ADD-ON: CPT | Performed by: NURSE PRACTITIONER

## 2023-05-02 PROCEDURE — 258N000003 HC RX IP 258 OP 636: Performed by: NURSE PRACTITIONER

## 2023-05-02 PROCEDURE — 250N000013 HC RX MED GY IP 250 OP 250 PS 637: Performed by: NURSE PRACTITIONER

## 2023-05-02 RX ORDER — HYDROMORPHONE HYDROCHLORIDE 1 MG/ML
0.5 INJECTION, SOLUTION INTRAMUSCULAR; INTRAVENOUS; SUBCUTANEOUS ONCE
Status: COMPLETED | OUTPATIENT
Start: 2023-05-02 | End: 2023-05-02

## 2023-05-02 RX ORDER — SODIUM CHLORIDE 9 MG/ML
INJECTION, SOLUTION INTRAVENOUS CONTINUOUS
Status: DISCONTINUED | OUTPATIENT
Start: 2023-05-02 | End: 2023-05-02 | Stop reason: HOSPADM

## 2023-05-02 RX ORDER — ONDANSETRON 2 MG/ML
4 INJECTION INTRAMUSCULAR; INTRAVENOUS ONCE
Status: COMPLETED | OUTPATIENT
Start: 2023-05-02 | End: 2023-05-02

## 2023-05-02 RX ORDER — OXYCODONE HYDROCHLORIDE 5 MG/1
5-10 TABLET ORAL EVERY 6 HOURS PRN
Qty: 6 TABLET | Refills: 0 | Status: SHIPPED | OUTPATIENT
Start: 2023-05-02 | End: 2023-10-17

## 2023-05-02 RX ORDER — NICOTINE 21 MG/24HR
1 PATCH, TRANSDERMAL 24 HOURS TRANSDERMAL DAILY
Status: DISCONTINUED | OUTPATIENT
Start: 2023-05-02 | End: 2023-05-02 | Stop reason: HOSPADM

## 2023-05-02 RX ADMIN — HYDROMORPHONE HYDROCHLORIDE 0.5 MG: 1 INJECTION, SOLUTION INTRAMUSCULAR; INTRAVENOUS; SUBCUTANEOUS at 00:06

## 2023-05-02 RX ADMIN — ONDANSETRON 4 MG: 2 INJECTION INTRAMUSCULAR; INTRAVENOUS at 00:06

## 2023-05-02 RX ADMIN — Medication 1 PATCH: at 00:26

## 2023-05-02 RX ADMIN — SODIUM CHLORIDE: 9 INJECTION, SOLUTION INTRAVENOUS at 00:40

## 2023-05-02 RX ADMIN — SODIUM CHLORIDE 1000 ML: 9 INJECTION, SOLUTION INTRAVENOUS at 00:06

## 2023-05-02 ASSESSMENT — ACTIVITIES OF DAILY LIVING (ADL): ADLS_ACUITY_SCORE: 35

## 2023-05-02 NOTE — ED NOTES
Discharge instructions imparted with the PT, given a copy of the map of the facility she's going to visit later in the morning.

## 2023-05-02 NOTE — DISCHARGE INSTRUCTIONS
I spoke with the oral surgeons Dr. Torrez and Dr. Stone at the [facility].  They asked that you call the phone number, [phone], at 8:00 this morning and let them know that Dr. Torrez and Dr. Stone wanted you worked into their clinic for drainage of a dental/facial abscess.  They stated that if the clinic is unable to get you worked in later today then they want you to go to the [facility] emergency room [place] ( see map) later today and they will see you at the emergency room and drain the abscess in the emergency room.  If there is any complications and you cannot get connected with the dental specialist/oral surgeons at the [facility] please return here.    Do not drive if using the oxycodone medication for pain control.

## 2023-05-02 NOTE — ED PROVIDER NOTES
History     Chief Complaint   Patient presents with     Facial Swelling     HPI  Zina Harrell is a 57 year old female with history of asthma who presents for evaluation of left facial swelling and redness. Symptoms started on Saturday, 2 days ago, with pain to the left cheek that she thought maybe was coming from a left upper molar/tooth. She tried to squeeze/pop the swelling thinking maybe a pimple. She did notice some purulent drainag coming from her inner buccal cheek. Gradually worsened and she developed swelling and redness around her left eye. Now left side of face, nose and left eye swollen. Left maxilla pain. No objective fevers. No pain with eye movement.  Patient is a current everyday smoker.  Occasional alcohol use.      Allergies:  Allergies   Allergen Reactions     Adhesive Tape Itching     Paper tape ok     Penicillin V      Childhood reaction unk       Problem List:    Patient Active Problem List    Diagnosis Date Noted     Hand arthritis 09/19/2019     Priority: Medium     Primary osteoarthritis of both hands 09/19/2019     Priority: Medium     Adjustment disorder with mixed anxiety and depressed mood 09/14/2016     Priority: Medium     Cervical high risk HPV (human papillomavirus) test positive 06/05/2015     Priority: Medium     6/5/15 normal pap/+ HR HPV (not 16 or 18). Plan: repeat pap and HPV testing in 1 year.   6/25/16 Dr. Kaye, recommended that patient have colposcopy.  8/6/15 [place] bx: negative. ECC: negative. Pap: NIL/neg HPV. Plan: cotest in 1 yr.  9/14/16 NIL pap/neg HR HPV. Plan: Repeat pap and HPV in 1 year.  11/27/17 NIL pap/neg HR HPV. Plan: cotest due 3 yr  2/10/21 NIL Pap, Neg HR HPV. Plan: Per [facility] cotest in 5 years, will verify with provider.          CARDIOVASCULAR SCREENING; LDL GOAL LESS THAN 160 06/04/2012     Priority: Medium     Tobacco abuse 06/04/2012     Priority: Medium     Asthma, intermittent controlled      Priority: Medium     age 16  hospitalized  Problem list name updated by automated process. Provider to review and confirm          Past Medical History:    Past Medical History:   Diagnosis Date     Asthma, intermittent controlled age 8     High risk HPV infection 6/5/15       Past Surgical History:    Past Surgical History:   Procedure Laterality Date     APPENDECTOMY  1982     COLONOSCOPY N/A 7/2/2018    Procedure: COLONOSCOPY;  COLONOSCOPY;  Surgeon: Sundeep Boston MD;  Location: [facility]     INJECT EPIDURAL TRANSFORAMINAL N/A 6/13/2019    Procedure: INJECTION, EPIDURAL, TRANSFORAMINAL APPROACH LUMBAR 5- SACRAL 1;  Surgeon: Phillip Walker MD;  Location: [facility]       Family History:    Family History   Problem Relation Age of Onset     Arthritis Mother         RA     Hypertension Mother      Other Cancer Mother         skin cancer     Cerebrovascular Disease Maternal Grandfather      Diabetes Sister         gestational     Coronary Artery Disease No family hx of      Breast Cancer No family hx of      Colon Cancer No family hx of      Genetic Disorder No family hx of      Thyroid Disease No family hx of        Social History:  Marital Status:   [2]  Social History     Tobacco Use     Smoking status: Every Day     Packs/day: 0.25     Types: Cigarettes     Smokeless tobacco: Never     Tobacco comments:     smoked since age 20   Vaping Use     Vaping status: Never Used   Substance Use Topics     Alcohol use: Yes     Comment: rarely     Drug use: No        Medications:    ACETAMINOPHEN PO  B Complex-C (VITAMIN B COMPLEX W/VITAMIN C) TABS tablet  cetirizine (ZYRTEC) 10 MG tablet  FLOVENT  MCG/ACT inhaler  furosemide (LASIX) 20 MG tablet  ibuprofen (ADVIL/MOTRIN) 200 MG capsule  multivitamin w/minerals (THERA-VIT-M) tablet  VENTOLIN  (90 Base) MCG/ACT inhaler          Review of Systems  As mentioned above in the history present illness. All other systems were reviewed and are negative.    Physical Exam   BP: 124/69  Pulse:  "100  Temp: 99  F (37.2  C)  Resp: 20  Height: 162.6 cm (5' 4\")  Weight: 77.1 kg (170 lb)  SpO2: 98 %      Physical Exam  Constitutional:       General: She is in acute distress.      Appearance: She is well-developed. She is not ill-appearing.   HENT:      Head: Normocephalic and atraumatic. Left periorbital erythema present.      Jaw: There is normal jaw occlusion. No trismus.      Right Ear: Tympanic membrane and external ear normal.      Left Ear: Tympanic membrane and external ear normal.      Nose:      Comments: Swelling along the upper nose extending from her left face and left eye.     Mouth/Throat:      Mouth: Mucous membranes are moist.      Dentition: Abnormal dentition. Dental tenderness (left upper molars) and dental caries (Several missing teeth.) present.      Pharynx: Oropharynx is clear.      Comments: SEE PICTURE BELOW  Eyes:      Conjunctiva/sclera: Conjunctivae normal.   Cardiovascular:      Rate and Rhythm: Normal rate and regular rhythm.      Heart sounds: Normal heart sounds. No murmur heard.  Pulmonary:      Effort: Pulmonary effort is normal. No respiratory distress.      Breath sounds: Normal breath sounds.   Musculoskeletal:         General: Normal range of motion.   Skin:     General: Skin is warm and dry.      Findings: No rash.   Neurological:      General: No focal deficit present.      Mental Status: She is alert and oriented to person, place, and time.             ED Course                 Procedures              Results for orders placed or performed during the hospital encounter of 05/01/23 (from the past 24 hour(s))   CBC with platelets differential    Narrative    The following orders were created for panel order CBC with platelets differential.  Procedure                               Abnormality         Status                     ---------                               -----------         ------                     CBC with platelets and d...[[id number]]  Abnormal            " Final result               Manual Differential[[id number]]          Abnormal            Final result                 Please view results for these tests on the individual orders.   Lactic acid whole blood   Result Value Ref Range    Lactic Acid 1.5 0.7 - 2.0 mmol/L   Basic metabolic panel   Result Value Ref Range    Sodium 135 (L) 136 - 145 mmol/L    Potassium 3.5 3.4 - 5.3 mmol/L    Chloride 100 98 - 107 mmol/L    Carbon Dioxide (CO2) 25 22 - 29 mmol/L    Anion Gap 10 7 - 15 mmol/L    Urea Nitrogen 16.9 6.0 - 20.0 mg/dL    Creatinine 0.99 (H) 0.51 - 0.95 mg/dL    Calcium 9.9 8.6 - 10.0 mg/dL    Glucose 115 (H) 70 - 99 mg/dL    GFR Estimate 67 >60 mL/min/1.73m2   CBC with platelets and differential   Result Value Ref Range    WBC Count 15.4 (H) 4.0 - 11.0 10e3/uL    RBC Count 4.23 3.80 - 5.20 10e6/uL    Hemoglobin 13.9 11.7 - 15.7 g/dL    Hematocrit 40.5 35.0 - 47.0 %    MCV 96 78 - 100 fL    MCH 32.9 26.5 - 33.0 pg    MCHC 34.3 31.5 - 36.5 g/dL    RDW 13.1 10.0 - 15.0 %    Platelet Count 126 (L) 150 - 450 10e3/uL   Manual Differential   Result Value Ref Range    % Neutrophils 90 %    % Lymphocytes 7 %    % Monocytes 1 %    % Eosinophils 0 %    % Basophils 0 %    % Metamyelocytes 2 %    Absolute Neutrophils 13.9 (H) 1.6 - 8.3 10e3/uL    Absolute Lymphocytes 1.1 0.8 - 5.3 10e3/uL    Absolute Monocytes 0.2 0.0 - 1.3 10e3/uL    Absolute Eosinophils 0.0 0.0 - 0.7 10e3/uL    Absolute Basophils 0.0 0.0 - 0.2 10e3/uL    Absolute Metamyelocytes 0.3 (H) <=0.0 10e3/uL    RBC Morphology Confirmed RBC Indices     Platelet Assessment  Automated Count Confirmed. Platelet morphology is normal.     Automated Count Confirmed. Platelet morphology is normal.   CT Facial Bones with Contrast    Narrative    EXAM: CT FACIAL BONES WITH CONTRAST  LOCATION: [facility]  DATE/TIME: 5/1/2023 10:45 PM CDT    INDICATION: Left cheek and periorbital redness and swelling.  COMPARISON: None.  CONTRAST: Isovue 370, 75  mL  TECHNIQUE: Routine CT Maxillofacial with IV contrast. Multiplanar reformats. Dose reduction techniques were used.     FINDINGS:  OSSEOUS STRUCTURES/SOFT TISSUES: Extensive dental and periodontal disease with multiple large dental caries. There is asymmetric soft tissue inflammatory change extending superiorly along the left nasolabial fold, left premaxillary soft tissues, left   nasal bone, and along the medial canthus. Asymmetric soft tissue swelling extending along the left zygoma, left temporal scalp, and left preseptal soft tissues.    There is ill-defined hypodensity along the gingival buccal recess and anterior maxillary spine (series 3, image 38), measuring approximately 2.2 x 0.4 cm in transverse by AP dimension.    There is a more well-defined peripherally enhancing gas and fluid collection that extends superiorly along the left frontal process of the maxilla, the left nasolabial fold, and the left nasal bone extending to the medial canthus, measuring approximately   1.5 x 0.6 x 4.9 cm in AP by transverse by craniocaudal dimension, worrisome for abscess formation.    ORBITAL CONTENTS: See above. No evidence of post septal inflammatory change.    SINUSES: Mild mucosal thickening scattered about the paranasal sinuses.    VISUALIZED INTRACRANIAL CONTENTS: No acute abnormality.       Impression    IMPRESSION:   1.  Extensive dental and periodontal disease with cellulitis throughout the left face and preseptal soft tissues.  2.  Ill-defined low density along the gingival buccal recess overlying the left anterior mandible may reflect phlegmonous change and developing abscess.  3.  An organized abscess extends superiorly along the left maxilla, nasal bones, and medial canthus, measuring approximately 1.5 x 0.6 x 4.9 cm in AP by transverse by craniocaudal dimension.    Findings were discussed with Michaela Hoff NP at 2342 hours on 05/01/2023.       Medications   0.9% sodium chloride BOLUS (1,000 mLs  Intravenous $New Bag 5/2/23 0006)     Followed by   sodium chloride 0.9% infusion (has no administration in time range)   nicotine Patch in Place (has no administration in time range)   nicotine (NICODERM CQ) 21 MG/24HR 24 hr patch 1 patch (1 patch Transdermal $Patch/Med Applied 5/2/23 0026)   iopamidol (ISOVUE-370) solution 500 mL (75 mLs Intravenous $Given 5/1/23 2238)   sodium chloride 0.9 % bag 100mL for CT scan flush use (60 mLs Intravenous $Given 5/1/23 2237)   cefTRIAXone (ROCEPHIN) 2 g vial to attach to  ml bag for ADULTS or NS 50 ml bag for PEDS (0 g Intravenous Stopped 5/1/23 2340)   HYDROmorphone (PF) (DILAUDID) injection 0.5 mg (0.5 mg Intravenous $Given 5/2/23 0006)   ondansetron (ZOFRAN) injection 4 mg (4 mg Intravenous $Given 5/2/23 0006)       Assessments & Plan (with Medical Decision Making)     57 year old female with very poor dentition who started to have left upper tooth pain 2 days ago with rapidly progressing facial swelling and redness.   Temp 99. Normotensive. No tachycardia.  Left periorbital swelling and erythema.  There is swelling to the left maxilla and upper nose region.  She has extensive poor dentition and dental caries.  Missing teeth.  She has tenderness in the left maxilla region.  No pain with eye movement.    WBC 15.4.  Normal lactic acid.  Sodium 135.  Creatinine 0.99.  CT facial bones with contrast was obtained and reveals extensive dental and periodontal disease.  There is ill-defined hypodensity along the gingival buccal recess and anterior maxillary spine.  There is a more well-defined peripherally enhancing gas and fluid collection that extends superiorly along the left frontal process of the maxilla, the left nasolabial fold, and the left nasal bone extending to the medial canthus.    See the full radiologist report above.     Patient has abscess that is likely extending from an odontogenic infection and causing associated facial cellulitis.    I discussed the lab  and imaging findings with patient.  Patient needs admission for IV antibiotics and likely maxillofacial specialty to get involved to address the facial abscess.    She will need higher level of care than what we are able to provide here.      I have signed out patient to Dr. Olmstead, emergency physician at th end of my shift.    New Prescriptions    No medications on file       Final diagnoses:   Facial cellulitis   Preseptal cellulitis of left eye   Acute abscess of maxillary sinus - Left   Dental abscess   Periodontal disease       5/1/2023   [facility] EMERGENCY DEPT     Kavya, ANIYAH Johnson CNP  05/02/23 0027

## 2023-09-15 DIAGNOSIS — J30.2 CHRONIC SEASONAL ALLERGIC RHINITIS: ICD-10-CM

## 2023-09-15 RX ORDER — CETIRIZINE HYDROCHLORIDE 10 MG/1
TABLET ORAL
Qty: 180 TABLET | Refills: 3 | Status: SHIPPED | OUTPATIENT
Start: 2023-09-15 | End: 2023-10-17

## 2023-10-17 ENCOUNTER — OFFICE VISIT (OUTPATIENT)
Dept: INTERNAL MEDICINE | Facility: CLINIC | Age: 58
End: 2023-10-17
Payer: COMMERCIAL

## 2023-10-17 VITALS
WEIGHT: 155 LBS | DIASTOLIC BLOOD PRESSURE: 84 MMHG | HEART RATE: 82 BPM | OXYGEN SATURATION: 100 % | TEMPERATURE: 98.3 F | RESPIRATION RATE: 16 BRPM | SYSTOLIC BLOOD PRESSURE: 128 MMHG | HEIGHT: 62 IN | BODY MASS INDEX: 28.52 KG/M2

## 2023-10-17 DIAGNOSIS — R06.02 SOB (SHORTNESS OF BREATH): ICD-10-CM

## 2023-10-17 DIAGNOSIS — Z23 NEED FOR PROPHYLACTIC VACCINATION AND INOCULATION AGAINST INFLUENZA: ICD-10-CM

## 2023-10-17 DIAGNOSIS — Z00.00 ENCOUNTER FOR ROUTINE ADULT HEALTH EXAMINATION WITHOUT ABNORMAL FINDINGS: Primary | ICD-10-CM

## 2023-10-17 DIAGNOSIS — J30.2 CHRONIC SEASONAL ALLERGIC RHINITIS: ICD-10-CM

## 2023-10-17 DIAGNOSIS — Z23 HIGH PRIORITY FOR 2019-NCOV VACCINE: ICD-10-CM

## 2023-10-17 DIAGNOSIS — R60.0 BILATERAL LOWER EXTREMITY EDEMA: ICD-10-CM

## 2023-10-17 DIAGNOSIS — J45.20 INTERMITTENT ASTHMA, WELL CONTROLLED: ICD-10-CM

## 2023-10-17 DIAGNOSIS — Z13.6 CARDIOVASCULAR SCREENING; LDL GOAL LESS THAN 160: ICD-10-CM

## 2023-10-17 DIAGNOSIS — Z12.31 VISIT FOR SCREENING MAMMOGRAM: ICD-10-CM

## 2023-10-17 DIAGNOSIS — Z72.0 TOBACCO ABUSE: ICD-10-CM

## 2023-10-17 LAB
ALBUMIN SERPL BCG-MCNC: 4.4 G/DL (ref 3.5–5.2)
ALP SERPL-CCNC: 82 U/L (ref 35–104)
ALT SERPL W P-5'-P-CCNC: 37 U/L (ref 0–50)
ANION GAP SERPL CALCULATED.3IONS-SCNC: 10 MMOL/L (ref 7–15)
AST SERPL W P-5'-P-CCNC: 37 U/L (ref 0–45)
BILIRUB SERPL-MCNC: 0.5 MG/DL
BUN SERPL-MCNC: 16.5 MG/DL (ref 6–20)
CALCIUM SERPL-MCNC: 9.5 MG/DL (ref 8.6–10)
CHLORIDE SERPL-SCNC: 102 MMOL/L (ref 98–107)
CHOLEST SERPL-MCNC: 120 MG/DL
CREAT SERPL-MCNC: 0.97 MG/DL (ref 0.51–0.95)
DEPRECATED HCO3 PLAS-SCNC: 27 MMOL/L (ref 22–29)
EGFRCR SERPLBLD CKD-EPI 2021: 69 ML/MIN/1.73M2
ERYTHROCYTE [DISTWIDTH] IN BLOOD BY AUTOMATED COUNT: 13.3 % (ref 10–15)
GLUCOSE SERPL-MCNC: 113 MG/DL (ref 70–99)
HCT VFR BLD AUTO: 41.4 % (ref 35–47)
HDLC SERPL-MCNC: 78 MG/DL
HGB BLD-MCNC: 13.5 G/DL (ref 11.7–15.7)
LDLC SERPL CALC-MCNC: 33 MG/DL
MCH RBC QN AUTO: 31.5 PG (ref 26.5–33)
MCHC RBC AUTO-ENTMCNC: 32.6 G/DL (ref 31.5–36.5)
MCV RBC AUTO: 97 FL (ref 78–100)
NONHDLC SERPL-MCNC: 42 MG/DL
PLATELET # BLD AUTO: 265 10E3/UL (ref 150–450)
POTASSIUM SERPL-SCNC: 4.3 MMOL/L (ref 3.4–5.3)
PROT SERPL-MCNC: 7.2 G/DL (ref 6.4–8.3)
RBC # BLD AUTO: 4.28 10E6/UL (ref 3.8–5.2)
SODIUM SERPL-SCNC: 139 MMOL/L (ref 135–145)
TRIGL SERPL-MCNC: 45 MG/DL
WBC # BLD AUTO: 6.7 10E3/UL (ref 4–11)

## 2023-10-17 PROCEDURE — 80053 COMPREHEN METABOLIC PANEL: CPT | Performed by: INTERNAL MEDICINE

## 2023-10-17 PROCEDURE — 85027 COMPLETE CBC AUTOMATED: CPT | Performed by: INTERNAL MEDICINE

## 2023-10-17 PROCEDURE — 36415 COLL VENOUS BLD VENIPUNCTURE: CPT | Performed by: INTERNAL MEDICINE

## 2023-10-17 PROCEDURE — 99396 PREV VISIT EST AGE 40-64: CPT | Mod: 25 | Performed by: INTERNAL MEDICINE

## 2023-10-17 PROCEDURE — 91320 SARSCV2 VAC 30MCG TRS-SUC IM: CPT | Performed by: INTERNAL MEDICINE

## 2023-10-17 PROCEDURE — 90480 ADMN SARSCOV2 VAC 1/ONLY CMP: CPT | Performed by: INTERNAL MEDICINE

## 2023-10-17 PROCEDURE — 90471 IMMUNIZATION ADMIN: CPT | Performed by: INTERNAL MEDICINE

## 2023-10-17 PROCEDURE — 80061 LIPID PANEL: CPT | Performed by: INTERNAL MEDICINE

## 2023-10-17 PROCEDURE — 90682 RIV4 VACC RECOMBINANT DNA IM: CPT | Performed by: INTERNAL MEDICINE

## 2023-10-17 RX ORDER — FUROSEMIDE 20 MG
TABLET ORAL
Qty: 90 TABLET | Refills: 3 | Status: SHIPPED | OUTPATIENT
Start: 2023-10-17

## 2023-10-17 RX ORDER — CETIRIZINE HYDROCHLORIDE 10 MG/1
TABLET ORAL
Qty: 180 TABLET | Refills: 3 | Status: SHIPPED | OUTPATIENT
Start: 2023-10-17 | End: 2024-03-04

## 2023-10-17 RX ORDER — ALBUTEROL SULFATE 90 UG/1
AEROSOL, METERED RESPIRATORY (INHALATION)
Qty: 18 G | Refills: 3 | Status: SHIPPED | OUTPATIENT
Start: 2023-10-17 | End: 2024-03-04

## 2023-10-17 RX ORDER — FLUTICASONE PROPIONATE 110 UG/1
2 AEROSOL, METERED RESPIRATORY (INHALATION) 2 TIMES DAILY
Qty: 36 G | Refills: 3 | Status: SHIPPED | OUTPATIENT
Start: 2023-10-17 | End: 2024-04-08

## 2023-10-17 ASSESSMENT — ENCOUNTER SYMPTOMS
ARTHRALGIAS: 1
HEADACHES: 1
HEMATURIA: 0
SHORTNESS OF BREATH: 1
DIZZINESS: 0
NAUSEA: 0
BREAST MASS: 0
FREQUENCY: 0
NERVOUS/ANXIOUS: 0
EYE PAIN: 0
COUGH: 1
PALPITATIONS: 0
DYSURIA: 0
WEAKNESS: 0
FEVER: 0
ABDOMINAL PAIN: 0
SORE THROAT: 0
CONSTIPATION: 1
PARESTHESIAS: 0
JOINT SWELLING: 1
CHILLS: 0
HEMATOCHEZIA: 0
DIARRHEA: 0
HEARTBURN: 1
MYALGIAS: 1

## 2023-10-17 ASSESSMENT — ASTHMA QUESTIONNAIRES: ACT_TOTALSCORE: 21

## 2023-10-17 NOTE — PROGRESS NOTES
SUBJECTIVE:   CC: Zina is an 57 year old who presents for preventive health visit.       10/17/2023    11:45 AM   Additional Questions   Roomed by Robina Stanton     Healthy Habits:     Getting at least 3 servings of Calcium per day:  NO    Bi-annual eye exam:  Yes    Dental care twice a year:  NO    Sleep apnea or symptoms of sleep apnea:  None    Diet:  Regular (no restrictions)    Frequency of exercise:  None    Taking medications regularly:  Yes    Medication side effects:  None    Additional concerns today:  No    Living in [place], same place.  Has a renter, girl there.  Not working.     Taking lasix for swelling, doing ok, trace edema.  Some numbness in the bottom of the feet,     Flovent inhaler rarely for breathing.     Facial cellulitis and treated in the ER. Went to [facility] and had two teeth pulled out.       Today's PHQ-2 Score:       10/17/2023    10:49 AM   PHQ-2 ( 1999 [employer])   Q1: Little interest or pleasure in doing things 0   Q2: Feeling down, depressed or hopeless 1   PHQ-2 Score 1   Q1: Little interest or pleasure in doing things Not at all   Q2: Feeling down, depressed or hopeless Several days   PHQ-2 Score 1               Social History     Tobacco Use    Smoking status: Every Day     Packs/day: .25     Types: Cigarettes    Smokeless tobacco: Never    Tobacco comments:     smoked since age 20   Substance Use Topics    Alcohol use: Yes     Comment: rarely           10/17/2023    10:48 AM   Alcohol Use   Prescreen: >3 drinks/day or >7 drinks/week? Not Applicable     Reviewed orders with patient.  Reviewed health maintenance and updated orders accordingly - Yes  Lab work is in process    Breast Cancer Screening:        10/17/2023    10:58 AM   Breast CA Risk Assessment (FHS-7)   Do you have a family history of breast, colon, or ovarian cancer? No / Unknown         Mammogram Screening: Recommended mammography every 1-2 years with patient discussion and risk factor  "consideration  Pertinent mammograms are reviewed under the imaging tab.    History of abnormal Pap smear: NO - age 30-65 PAP every 5 years with negative HPV co-testing recommended      Latest Ref Rng & Units 2/10/2021     1:55 PM 2/10/2021     1:53 PM 11/27/2017     1:13 PM   PAP / HPV   PAP (Historical)  NIL   NIL    HPV 16 DNA NEG^Negative  Negative     HPV 18 DNA NEG^Negative  Negative     Other HR HPV NEG^Negative  Negative       Reviewed and updated as needed this visit by clinical staff   Tobacco  Allergies  Meds              Reviewed and updated as needed this visit by Provider                   Review of Systems   Constitutional:  Negative for chills and fever.   HENT:  Positive for congestion. Negative for ear pain, hearing loss and sore throat.    Eyes:  Negative for pain and visual disturbance.   Respiratory:  Positive for cough and shortness of breath.    Cardiovascular:  Negative for chest pain, palpitations and peripheral edema.   Gastrointestinal:  Positive for constipation and heartburn. Negative for abdominal pain, diarrhea, hematochezia and nausea.   Breasts:  Negative for tenderness, breast mass and discharge.   Genitourinary:  Negative for dysuria, frequency, genital sores, hematuria, pelvic pain, vaginal bleeding and vaginal discharge.   Musculoskeletal:  Positive for arthralgias, joint swelling and myalgias.   Skin:  Negative for rash.   Neurological:  Positive for headaches. Negative for dizziness, weakness and paresthesias.   Psychiatric/Behavioral:  Negative for mood changes. The patient is not nervous/anxious.           OBJECTIVE:   /84   Pulse 82   Temp 98.3  F (36.8  C) (Temporal)   Resp 16   Ht 1.581 m (5' 2.25\")   Wt 70.3 kg (155 lb)   LMP 03/14/2016 (Within Months)   SpO2 100%   BMI 28.12 kg/m    Physical Exam  GENERAL: healthy, alert and no distress  EYES: Eyes grossly normal to inspection, PERRL and conjunctivae and sclerae normal  HENT: ear canals and TM's normal, " nose and mouth without ulcers or lesions  NECK: no adenopathy, no asymmetry, masses, or scars and thyroid normal to palpation  RESP: lungs clear to auscultation - no rales, rhonchi or wheezes  CV: regular rate and rhythm, normal S1 S2, no S3 or S4, no murmur, click or rub, no peripheral edema and peripheral pulses strong  ABDOMEN: soft, nontender, no hepatosplenomegaly, no masses and bowel sounds normal  MS: no gross musculoskeletal defects noted, slight swelling   SKIN: no suspicious lesions or rashes  NEURO: Normal strength and tone, mentation intact and speech normal  PSYCH: mentation appears normal, affect normal/bright        ASSESSMENT/PLAN:       ICD-10-CM    1. Encounter for routine adult health examination without abnormal findings  Z00.00 CBC with platelets     Comprehensive metabolic panel (BMP + Alb, Alk Phos, ALT, AST, Total. Bili, TP)     CBC with platelets     Comprehensive metabolic panel (BMP + Alb, Alk Phos, ALT, AST, Total. Bili, TP)      2. Visit for screening mammogram  Z12.31 MA SCREENING DIGITAL BILAT - Future  (s+30)      3. Need for prophylactic vaccination and inoculation against influenza  Z23       4. High priority for 2019-nCoV vaccine  Z23       5. Chronic seasonal allergic rhinitis  J30.2 cetirizine (ALLERGY RELIEF CETIRIZINE) 10 MG tablet      6. Asthma, intermittent controlled  J45.20 fluticasone (FLOVENT HFA) 110 MCG/ACT inhaler     VENTOLIN  (90 Base) MCG/ACT inhaler      7. Bilateral lower extremity edema  R60.0 furosemide (LASIX) 20 MG tablet      8. SOB (shortness of breath)  R06.02 VENTOLIN  (90 Base) MCG/ACT inhaler      9. CARDIOVASCULAR SCREENING; LDL GOAL LESS THAN 160  Z13.6 Comprehensive metabolic panel (BMP + Alb, Alk Phos, ALT, AST, Total. Bili, TP)     Lipid panel reflex to direct LDL Fasting     Comprehensive metabolic panel (BMP + Alb, Alk Phos, ALT, AST, Total. Bili, TP)     Lipid panel reflex to direct LDL Fasting      10. Tobacco abuse  Z72.0 CBC  "with platelets     CBC with platelets        Patient is here for yearly physical.  She is living at her same house.    She had flu shot and COVID shot today.    We will order a mammogram for her.    We will refill medications, continue Lasix and check labs this is for lower extremity swelling which she has mild amounts of  Shortness of breath she will continue with albuterol inhaler for shortness of breath and asthma.  Continue Flovent for her asthma as well.  Tobacco abuse recommended complete tobacco cessation but she is not interested in this.        COUNSELING:  Reviewed preventive health counseling, as reflected in patient instructions       Regular exercise       Healthy diet/nutrition      BMI:   Estimated body mass index is 28.12 kg/m  as calculated from the following:    Height as of this encounter: 1.581 m (5' 2.25\").    Weight as of this encounter: 70.3 kg (155 lb).   Weight management plan: Discussed healthy diet and exercise guidelines      She reports that she has been smoking. She has been smoking an average of .25 packs per day. She has never used smokeless tobacco.  Nicotine/Tobacco Cessation Plan:   Information offered: Patient not interested at this time          Lebron Childers MD  [facility]  "

## 2023-10-18 ENCOUNTER — TELEPHONE (OUTPATIENT)
Dept: INTERNAL MEDICINE | Facility: CLINIC | Age: 58
End: 2023-10-18
Payer: COMMERCIAL

## 2023-10-18 NOTE — TELEPHONE ENCOUNTER
----- Message from Lebron Childers MD sent at 10/17/2023 10:37 PM CDT -----  Please let her know her labs are good, nothing to change

## 2024-03-04 DIAGNOSIS — J45.20 INTERMITTENT ASTHMA, WELL CONTROLLED: ICD-10-CM

## 2024-03-04 DIAGNOSIS — R06.02 SOB (SHORTNESS OF BREATH): ICD-10-CM

## 2024-03-04 DIAGNOSIS — J30.2 CHRONIC SEASONAL ALLERGIC RHINITIS: ICD-10-CM

## 2024-03-04 RX ORDER — CETIRIZINE HYDROCHLORIDE 10 MG/1
10 TABLET ORAL 2 TIMES DAILY
Qty: 180 TABLET | Refills: 1 | Status: SHIPPED | OUTPATIENT
Start: 2024-03-04

## 2024-03-04 RX ORDER — ALBUTEROL SULFATE 90 UG/1
AEROSOL, METERED RESPIRATORY (INHALATION)
Qty: 18 G | Refills: 0 | Status: SHIPPED | OUTPATIENT
Start: 2024-03-04 | End: 2024-04-08

## 2024-03-06 ENCOUNTER — ANCILLARY PROCEDURE (OUTPATIENT)
Dept: MAMMOGRAPHY | Facility: OTHER | Age: 59
End: 2024-03-06
Attending: INTERNAL MEDICINE
Payer: COMMERCIAL

## 2024-03-06 DIAGNOSIS — Z12.31 VISIT FOR SCREENING MAMMOGRAM: ICD-10-CM

## 2024-03-06 PROCEDURE — 77067 SCR MAMMO BI INCL CAD: CPT | Mod: TC | Performed by: STUDENT IN AN ORGANIZED HEALTH CARE EDUCATION/TRAINING PROGRAM

## 2024-04-08 DIAGNOSIS — R06.02 SOB (SHORTNESS OF BREATH): ICD-10-CM

## 2024-04-08 DIAGNOSIS — J45.20 INTERMITTENT ASTHMA, WELL CONTROLLED: ICD-10-CM

## 2024-04-08 RX ORDER — ALBUTEROL SULFATE 90 UG/1
AEROSOL, METERED RESPIRATORY (INHALATION)
Qty: 18 G | Refills: 0 | Status: SHIPPED | OUTPATIENT
Start: 2024-04-08 | End: 2024-06-06

## 2024-04-08 RX ORDER — FLUTICASONE PROPIONATE 110 UG/1
2 AEROSOL, METERED RESPIRATORY (INHALATION) 2 TIMES DAILY
Qty: 36 G | Refills: 0 | Status: SHIPPED | OUTPATIENT
Start: 2024-04-08

## 2024-05-13 DIAGNOSIS — R06.02 SOB (SHORTNESS OF BREATH): ICD-10-CM

## 2024-05-13 DIAGNOSIS — J45.20 INTERMITTENT ASTHMA, WELL CONTROLLED: ICD-10-CM

## 2024-05-13 RX ORDER — FLUTICASONE PROPIONATE 110 UG/1
2 AEROSOL, METERED RESPIRATORY (INHALATION) 2 TIMES DAILY
Qty: 36 G | Refills: 0 | OUTPATIENT
Start: 2024-05-13

## 2024-05-13 RX ORDER — ALBUTEROL SULFATE 90 UG/1
AEROSOL, METERED RESPIRATORY (INHALATION)
Qty: 18 G | Refills: 0 | OUTPATIENT
Start: 2024-05-13

## 2024-06-05 DIAGNOSIS — R06.02 SOB (SHORTNESS OF BREATH): ICD-10-CM

## 2024-06-05 DIAGNOSIS — J45.20 INTERMITTENT ASTHMA, WELL CONTROLLED: ICD-10-CM

## 2024-06-06 RX ORDER — ALBUTEROL SULFATE 90 UG/1
AEROSOL, METERED RESPIRATORY (INHALATION)
Qty: 18 G | Refills: 0 | Status: SHIPPED | OUTPATIENT
Start: 2024-06-06 | End: 2024-07-16

## 2024-07-16 DIAGNOSIS — J45.20 INTERMITTENT ASTHMA, WELL CONTROLLED: ICD-10-CM

## 2024-07-16 DIAGNOSIS — R06.02 SOB (SHORTNESS OF BREATH): ICD-10-CM

## 2024-07-16 RX ORDER — ALBUTEROL SULFATE 90 UG/1
AEROSOL, METERED RESPIRATORY (INHALATION)
Qty: 18 G | Refills: 0 | Status: SHIPPED | OUTPATIENT
Start: 2024-07-16 | End: 2024-09-05

## 2024-09-05 DIAGNOSIS — J45.20 INTERMITTENT ASTHMA, WELL CONTROLLED: ICD-10-CM

## 2024-09-05 DIAGNOSIS — R06.02 SOB (SHORTNESS OF BREATH): ICD-10-CM

## 2024-09-05 RX ORDER — ALBUTEROL SULFATE 90 UG/1
AEROSOL, METERED RESPIRATORY (INHALATION)
Qty: 18 G | Refills: 0 | Status: SHIPPED | OUTPATIENT
Start: 2024-09-05

## 2024-10-15 DIAGNOSIS — R06.02 SOB (SHORTNESS OF BREATH): ICD-10-CM

## 2024-10-15 DIAGNOSIS — J45.20 INTERMITTENT ASTHMA, WELL CONTROLLED: ICD-10-CM

## 2024-10-16 RX ORDER — ALBUTEROL SULFATE 90 UG/1
AEROSOL, METERED RESPIRATORY (INHALATION)
Qty: 18 G | Refills: 0 | Status: SHIPPED | OUTPATIENT
Start: 2024-10-16

## 2024-10-17 DIAGNOSIS — J45.20 INTERMITTENT ASTHMA, WELL CONTROLLED: ICD-10-CM

## 2024-10-17 DIAGNOSIS — R06.02 SOB (SHORTNESS OF BREATH): ICD-10-CM

## 2024-10-18 RX ORDER — ALBUTEROL SULFATE 90 UG/1
AEROSOL, METERED RESPIRATORY (INHALATION)
Qty: 18 G | Refills: 0 | OUTPATIENT
Start: 2024-10-18

## 2024-11-11 DIAGNOSIS — R60.0 BILATERAL LOWER EXTREMITY EDEMA: ICD-10-CM

## 2024-11-11 RX ORDER — FUROSEMIDE 20 MG/1
TABLET ORAL
Qty: 90 TABLET | Refills: 0 | Status: SHIPPED | OUTPATIENT
Start: 2024-11-11

## 2024-11-18 DIAGNOSIS — J45.20 INTERMITTENT ASTHMA, WELL CONTROLLED: ICD-10-CM

## 2024-11-18 DIAGNOSIS — R06.02 SOB (SHORTNESS OF BREATH): ICD-10-CM

## 2024-11-18 RX ORDER — FLUTICASONE PROPIONATE 110 UG/1
2 AEROSOL, METERED RESPIRATORY (INHALATION) 2 TIMES DAILY
Qty: 36 G | Refills: 0 | Status: SHIPPED | OUTPATIENT
Start: 2024-11-18

## 2024-11-18 RX ORDER — ALBUTEROL SULFATE 90 UG/1
AEROSOL, METERED RESPIRATORY (INHALATION)
Qty: 18 G | Refills: 3 | Status: SHIPPED | OUTPATIENT
Start: 2024-11-18

## 2025-02-11 DIAGNOSIS — J45.20 INTERMITTENT ASTHMA, WELL CONTROLLED: ICD-10-CM

## 2025-02-11 DIAGNOSIS — R60.0 BILATERAL LOWER EXTREMITY EDEMA: ICD-10-CM

## 2025-02-13 RX ORDER — FLUTICASONE PROPIONATE 110 UG/1
2 AEROSOL, METERED RESPIRATORY (INHALATION) 2 TIMES DAILY
Qty: 36 G | Refills: 0 | Status: SHIPPED | OUTPATIENT
Start: 2025-02-13

## 2025-02-13 RX ORDER — FUROSEMIDE 20 MG/1
TABLET ORAL
Qty: 90 TABLET | Refills: 0 | Status: SHIPPED | OUTPATIENT
Start: 2025-02-13

## 2025-02-13 NOTE — TELEPHONE ENCOUNTER
Patient returning call. Scheduled in next available opening for AWV. Which is 04/14.     Please advise if patient ok to have refills until then.     If refills sent care team does not need to contact patient. If needing to schedule sooner, or refills not provided patient would prefer return phone call.     Lane Trjuillo RN on 2/13/2025 at 11:15 AM

## 2025-02-13 NOTE — TELEPHONE ENCOUNTER
Dr. Childers please advise. Do they have to be seen sooner? If not please approve and close encounter.

## 2025-02-13 NOTE — TELEPHONE ENCOUNTER
Left message for patient to call back. Also contacted pharmacy, if she calls there, they will inform her also.    Linda Bradshaw CMA

## 2025-05-20 DIAGNOSIS — R60.0 BILATERAL LOWER EXTREMITY EDEMA: ICD-10-CM

## 2025-05-20 DIAGNOSIS — J45.20 INTERMITTENT ASTHMA, WELL CONTROLLED: ICD-10-CM

## 2025-05-20 DIAGNOSIS — R06.02 SOB (SHORTNESS OF BREATH): ICD-10-CM

## 2025-05-20 RX ORDER — ALBUTEROL SULFATE 90 UG/1
AEROSOL, METERED RESPIRATORY (INHALATION)
Qty: 18 G | Refills: 3 | OUTPATIENT
Start: 2025-05-20

## 2025-05-20 RX ORDER — FUROSEMIDE 20 MG/1
20 TABLET ORAL
Qty: 90 TABLET | Refills: 3 | OUTPATIENT
Start: 2025-05-20

## 2025-07-11 ENCOUNTER — TELEPHONE (OUTPATIENT)
Dept: INTERNAL MEDICINE | Facility: CLINIC | Age: 60
End: 2025-07-11

## 2025-07-11 NOTE — TELEPHONE ENCOUNTER
General Call    Contacts       Contact Date/Time Type Contact Phone/Fax    07/11/2025 05:15 PM CDT Phone (Incoming) Zina Harrell (Self) [phone] (H)          Reason for Call:  Patient was just seen today and forgot about the after visit summery. Can care team send her a paper copy of the after visit summery for today (7/11/25).     What are your questions or concerns:  na    Date of last appointment with provider: 7/11/25     Okay to leave a detailed message?: Yes at Cell number on file:    Telephone Information:   Mobile [phone]

## 2025-07-14 ENCOUNTER — PATIENT OUTREACH (OUTPATIENT)
Dept: CARE COORDINATION | Facility: CLINIC | Age: 60
End: 2025-07-14
Payer: COMMERCIAL

## (undated) DEVICE — NDL ECLIPSE 18GA 1.5"

## (undated) DEVICE — TUBING IV EXTENSION SET 34"

## (undated) DEVICE — PREP CHLORAPREP 26ML TINTED ORANGE  260815

## (undated) DEVICE — TRAY PROCEDURE SUPPORT PAIN MANAGEMENT 332114

## (undated) DEVICE — SYR 10ML PERFIX LL 332152

## (undated) DEVICE — SYR 05ML LL W/O NDL

## (undated) DEVICE — SYR 10ML FINGER CONTROL W/O NDL 309695

## (undated) DEVICE — NDL COUNTER 10CT

## (undated) DEVICE — NDL COUNTER 20CT 31142493

## (undated) DEVICE — GLOVE PROTEXIS W/NEU-THERA 7.5  2D73TE75

## (undated) RX ORDER — PROPOFOL 10 MG/ML
INJECTION, EMULSION INTRAVENOUS
Status: DISPENSED
Start: 2018-07-02

## (undated) RX ORDER — LIDOCAINE HYDROCHLORIDE 20 MG/ML
INJECTION, SOLUTION EPIDURAL; INFILTRATION; INTRACAUDAL; PERINEURAL
Status: DISPENSED
Start: 2018-07-02